# Patient Record
Sex: FEMALE | Race: ASIAN | NOT HISPANIC OR LATINO | Employment: FULL TIME | URBAN - METROPOLITAN AREA
[De-identification: names, ages, dates, MRNs, and addresses within clinical notes are randomized per-mention and may not be internally consistent; named-entity substitution may affect disease eponyms.]

---

## 2017-11-11 ENCOUNTER — OFFICE VISIT (OUTPATIENT)
Dept: URGENT CARE | Facility: PHYSICIAN GROUP | Age: 32
End: 2017-11-11
Payer: COMMERCIAL

## 2017-11-11 VITALS
HEIGHT: 64 IN | DIASTOLIC BLOOD PRESSURE: 90 MMHG | OXYGEN SATURATION: 100 % | TEMPERATURE: 98.6 F | SYSTOLIC BLOOD PRESSURE: 140 MMHG | WEIGHT: 130 LBS | BODY MASS INDEX: 22.2 KG/M2 | RESPIRATION RATE: 14 BRPM | HEART RATE: 99 BPM

## 2017-11-11 DIAGNOSIS — M54.41 ACUTE RIGHT-SIDED LOW BACK PAIN WITH RIGHT-SIDED SCIATICA: ICD-10-CM

## 2017-11-11 PROCEDURE — 99204 OFFICE O/P NEW MOD 45 MIN: CPT | Performed by: NURSE PRACTITIONER

## 2017-11-11 RX ORDER — CYCLOBENZAPRINE HCL 5 MG
5-10 TABLET ORAL 2 TIMES DAILY PRN
Qty: 20 TAB | Refills: 0 | Status: SHIPPED | OUTPATIENT
Start: 2017-11-11 | End: 2017-11-16

## 2017-11-11 RX ORDER — HYDROCODONE BITARTRATE AND ACETAMINOPHEN 5; 325 MG/1; MG/1
1-2 TABLET ORAL EVERY 8 HOURS PRN
Qty: 12 TAB | Refills: 0 | Status: SHIPPED | OUTPATIENT
Start: 2017-11-11

## 2017-11-11 RX ORDER — METHYLPREDNISOLONE 4 MG/1
4 TABLET ORAL DAILY
Qty: 1 KIT | Refills: 0 | Status: SHIPPED | OUTPATIENT
Start: 2017-11-11

## 2017-11-11 NOTE — PROGRESS NOTES
Chief Complaint   Patient presents with   • Back Pain       HISTORY OF PRESENT ILLNESS: Patient is a 32 y.o. female who presents today due to three days of low back pain. She initially felt back pain after lifting a wheelchair into her car. Since then she has had right-sided low back pain which radiates down her right leg. She reports intermittent tingling sensation to this leg as well. She admits to a history of low back pain with intermittent exacerbations, last experience personally 3 years ago. Denies fever, chills, malaise, hematuria, saddle anesthesia, numbness or tingling, unilateral weakness, or loss of bowel or bladder. She has not taken anything for symptom relief.      There are no active problems to display for this patient.      Allergies:Sertraline    Current Outpatient Prescriptions Ordered in [facility]   Medication Sig Dispense Refill   • BuPROPion HCl (WELLBUTRIN PO) Take  by mouth.     • MethylPREDNISolone (MEDROL DOSEPAK) 4 MG Tablet Therapy Pack Take 1 Tab by mouth every day. Take with food. 1 Kit 0   • cyclobenzaprine (FLEXERIL) 5 MG tablet Take 1-2 Tabs by mouth 2 times a day as needed for Moderate Pain or Severe Pain. 20 Tab 0   • hydrocodone-acetaminophen (NORCO) 5-325 MG Tab per tablet Take 1-2 Tabs by mouth every 8 hours as needed. Do not drive, work, drink alcohol or engaged in potentially hazardous activity while taking this medication. 12 Tab 0     No current Epic-ordered facility-administered medications on file.        History reviewed. No pertinent past medical history.    Social History   Substance Use Topics   • Smoking status: Never Smoker   • Smokeless tobacco: Never Used   • Alcohol use Yes      Comment: occ       No family status information on file.   History reviewed. No pertinent family history.    ROS:  Review of Systems   Constitutional: Negative for fever, chills, weight loss, malaise, and fatigue.   HENT: Negative for ear pain, nosebleeds, congestion, sore throat and neck  "pain.    Eyes: Negative for vision changes.   Neuro: Negative for headache, sensory changes, weakness, seizure, LOC.   Cardiovascular: Negative for chest pain, palpitations, orthopnea and leg swelling.   Respiratory: Negative for cough, sputum production, shortness of breath and wheezing.   Gastrointestinal: Negative for abdominal pain, nausea, vomiting or diarrhea.   Genitourinary: Negative for dysuria, urgency and frequency.  Musculoskeletal: Positive for low back pain. Negative for falls, neck pain, joint pain, myalgias.   Skin: Negative for rash, diaphoresis.     Exam:  Blood pressure 140/90, pulse 99, temperature 37 °C (98.6 °F), resp. rate 14, height 1.626 m (5' 4\"), weight 59 kg (130 lb), SpO2 100 %.  General: well-nourished, well-developed female in NAD  Head: normocephalic, atraumatic  Eyes: PERRLA, no conjunctival injection, acuity grossly intact, lids normal.  Ears: normal shape and symmetry, no tenderness, no discharge. External canals are without any significant edema or erythema. Tympanic membranes are without any inflammation, no effusion. Gross auditory acuity is intact.  Nose: symmetrical without tenderness, no discharge.  Mouth/Throat: reasonable hygiene, no erythema, exudates or tonsillar enlargement.  Neck: no masses, range of motion within normal limits, no tracheal deviation. No obvious thyroid enlargement.   Lymph: no cervical adenopathy. No supraclavicular adenopathy.   Neuro: alert and oriented. Cranial nerves 1-12 grossly intact. No sensory deficit.   Cardiovascular: regular rate and rhythm. No edema.  Pulmonary: no distress. Chest is symmetrical with respiration, no wheezes, crackles, or rhonchi.   Musculoskeletal: Lumbar back: pt exhibits tenderness to the lumbar spine. Pt exhibits no swelling, spasm, no edema or deformity. Patient has normal reflexes, patellar reflexes are 2+ on the right side and 2+ on the left side. Patient exhibits normal muscle tone. Negative straight leg raises. " Gait even and steady. No clubbing.   Skin: warm, dry, intact, no clubbing, no cyanosis, no rashes.   Psych: appropriate mood, affect, judgement.         Assessment/Plan:  1. Acute right-sided low back pain with right-sided sciatica  MethylPREDNISolone (MEDROL DOSEPAK) 4 MG Tablet Therapy Pack    cyclobenzaprine (FLEXERIL) 5 MG tablet    hydrocodone-acetaminophen (NORCO) 5-325 MG Tab per tablet         Ice and heat therapy. Gentle stretches. Medrol as directed, take with food. Flexeril or Norco for symptom relief, sedative effects medication discussed.  Supportive care, differential diagnoses, and indications for immediate follow-up discussed with patient.   Pathogenesis of diagnosis discussed including typical length and natural progression.   Instructed to return to clinic or nearest emergency department for any change in condition, further concerns, or worsening of symptoms.  Patient states understanding of the plan of care and discharge instructions.  Instructed to make an appointment, for follow up, with their primary care provider.        Please note that this dictation was created using voice recognition software. I have made every reasonable attempt to correct obvious errors, but I expect that there are errors of grammar and possibly content that I did not discover before finalizing the note.      JHOANA Gregorio.

## 2017-11-16 ENCOUNTER — OFFICE VISIT (OUTPATIENT)
Dept: URGENT CARE | Facility: PHYSICIAN GROUP | Age: 32
End: 2017-11-16
Payer: COMMERCIAL

## 2017-11-16 VITALS
WEIGHT: 130 LBS | OXYGEN SATURATION: 98 % | RESPIRATION RATE: 14 BRPM | BODY MASS INDEX: 22.2 KG/M2 | DIASTOLIC BLOOD PRESSURE: 88 MMHG | TEMPERATURE: 98.3 F | HEART RATE: 100 BPM | HEIGHT: 64 IN | SYSTOLIC BLOOD PRESSURE: 142 MMHG

## 2017-11-16 DIAGNOSIS — M54.41 ACUTE RIGHT-SIDED LOW BACK PAIN WITH RIGHT-SIDED SCIATICA: ICD-10-CM

## 2017-11-16 PROCEDURE — 99214 OFFICE O/P EST MOD 30 MIN: CPT | Performed by: FAMILY MEDICINE

## 2017-11-16 RX ORDER — MELOXICAM 15 MG/1
15 TABLET ORAL DAILY
Qty: 30 TAB | Refills: 0 | Status: SHIPPED | OUTPATIENT
Start: 2017-11-16

## 2017-11-16 RX ORDER — FAMOTIDINE 20 MG/1
20 TABLET, FILM COATED ORAL 2 TIMES DAILY
Qty: 60 TAB | Refills: 0 | Status: SHIPPED | OUTPATIENT
Start: 2017-11-16

## 2017-11-16 RX ORDER — OMEPRAZOLE 20 MG/1
20 CAPSULE, DELAYED RELEASE ORAL DAILY
Qty: 30 CAP | Refills: 0 | Status: SHIPPED | OUTPATIENT
Start: 2017-11-16

## 2017-11-16 RX ORDER — TIZANIDINE 4 MG/1
4 TABLET ORAL EVERY 6 HOURS PRN
Qty: 30 TAB | Refills: 0 | Status: SHIPPED | OUTPATIENT
Start: 2017-11-16

## 2017-11-16 ASSESSMENT — ENCOUNTER SYMPTOMS
WEAKNESS: 0
NUMBNESS: 0
PARESIS: 0
PERIANAL NUMBNESS: 0
PARESTHESIAS: 0
BACK PAIN: 1
TINGLING: 0
BOWEL INCONTINENCE: 0

## 2017-11-16 NOTE — PATIENT INSTRUCTIONS
Back Pain, Adult  Back pain is very common in adults. The cause of back pain is rarely dangerous and the pain often gets better over time. The cause of your back pain may not be known. Some common causes of back pain include:  · Strain of the muscles or ligaments supporting the spine.  · Wear and tear (degeneration) of the spinal disks.  · Arthritis.  · Direct injury to the back.  For many people, back pain may return. Since back pain is rarely dangerous, most people can learn to manage this condition on their own.  HOME CARE INSTRUCTIONS  Watch your back pain for any changes. The following actions may help to lessen any discomfort you are feeling:  · Remain active. It is stressful on your back to sit or  one place for long periods of time. Do not sit, drive, or  one place for more than 30 minutes at a time. Take short walks on even surfaces as soon as you are able. Try to increase the length of time you walk each day.  · Exercise regularly as directed by your health care provider. Exercise helps your back heal faster. It also helps avoid future injury by keeping your muscles strong and flexible.  · Do not stay in bed. Resting more than 1-2 days can delay your recovery.  · Pay attention to your body when you bend and lift. The most comfortable positions are those that put less stress on your recovering back. Always use proper lifting techniques, including:  ¨ Bending your knees.  ¨ Keeping the load close to your body.  ¨ Avoiding twisting.  · Find a comfortable position to sleep. Use a firm mattress and lie on your side with your knees slightly bent. If you lie on your back, put a pillow under your knees.  · Avoid feeling anxious or stressed. Stress increases muscle tension and can worsen back pain. It is important to recognize when you are anxious or stressed and learn ways to manage it, such as with exercise.  · Take medicines only as directed by your health care provider. Over-the-counter  medicines to reduce pain and inflammation are often the most helpful. Your health care provider may prescribe muscle relaxant drugs. These medicines help dull your pain so you can more quickly return to your normal activities and healthy exercise.  · Apply ice to the injured area:  ¨ Put ice in a plastic bag.  ¨ Place a towel between your skin and the bag.  ¨ Leave the ice on for 20 minutes, 2-3 times a day for the first 2-3 days. After that, ice and heat may be alternated to reduce pain and spasms.  · Maintain a healthy weight. Excess weight puts extra stress on your back and makes it difficult to maintain good posture.  SEEK MEDICAL CARE IF:  · You have pain that is not relieved with rest or medicine.  · You have increasing pain going down into the legs or buttocks.  · You have pain that does not improve in one week.  · You have night pain.  · You lose weight.  · You have a fever or chills.  SEEK IMMEDIATE MEDICAL CARE IF:   · You develop new bowel or bladder control problems.  · You have unusual weakness or numbness in your arms or legs.  · You develop nausea or vomiting.  · You develop abdominal pain.  · You feel faint.     This information is not intended to replace advice given to you by your health care provider. Make sure you discuss any questions you have with your health care provider.     Document Released: 12/18/2006 Document Revised: 01/08/2016 Document Reviewed: 04/21/2015  [employer] Interactive Patient Education ©2016 [employer]

## 2017-11-16 NOTE — PROGRESS NOTES
"Subjective:   Peggy Quick a 32 y.o. female who presents for Back Pain (back pain pt was in here saturday not feeling any better )        Back Pain   This is a new problem. The current episode started in the past 7 days. The problem occurs constantly. The problem has been gradually worsening since onset. The pain is present in the gluteal and sacro-iliac. The quality of the pain is described as aching. The pain radiates to the right thigh. Pertinent negatives include no bladder incontinence, bowel incontinence, numbness, paresis, paresthesias, perianal numbness, tingling or weakness.     Review of Systems   Gastrointestinal: Negative for bowel incontinence.   Genitourinary: Negative for bladder incontinence.   Musculoskeletal: Positive for back pain.   Neurological: Negative for tingling, weakness, numbness and paresthesias.     Allergies   Allergen Reactions   • Sertraline Anxiety      Objective:   /88   Pulse 100   Temp 36.8 °C (98.3 °F)   Resp 14   Ht 1.626 m (5' 4\")   Wt 59 kg (130 lb)   SpO2 98%   BMI 22.31 kg/m²   Physical Exam   Constitutional: She is oriented to person, place, and time. She appears well-developed and well-nourished. No distress.   HENT:   Head: Normocephalic and atraumatic.   Eyes: Conjunctivae and EOM are normal. Pupils are equal, round, and reactive to light.   Cardiovascular: Normal rate, regular rhythm, normal heart sounds and intact distal pulses.    No murmur heard.  Pulmonary/Chest: Effort normal and breath sounds normal. No respiratory distress.   Abdominal: Soft. Bowel sounds are normal. She exhibits no distension. There is no tenderness.   Musculoskeletal:        Lumbar back: She exhibits decreased range of motion, tenderness and spasm. She exhibits no bony tenderness and no deformity.   Neurological: She is alert and oriented to person, place, and time. She has normal reflexes. No sensory deficit.   Skin: Skin is warm and dry.   Psychiatric: She has a normal mood " and affect. Her behavior is normal.         Assessment/Plan:   Assessment    1. Acute right-sided low back pain with right-sided sciatica  Differential diagnosis, natural history, supportive care, and indications for immediate follow-up discussed.   - meloxicam (MOBIC) 15 MG tablet; Take 1 Tab by mouth every day.  Dispense: 30 Tab; Refill: 0  - tizanidine (ZANAFLEX) 4 MG Tab; Take 1 Tab by mouth every 6 hours as needed.  Dispense: 30 Tab; Refill: 0  - famotidine (PEPCID) 20 MG Tab; Take 1 Tab by mouth 2 times a day.  Dispense: 60 Tab; Refill: 0  - omeprazole (PRILOSEC) 20 MG delayed-release capsule; Take 1 Cap by mouth every day.  Dispense: 30 Cap; Refill: 0

## 2018-06-11 ENCOUNTER — OFFICE VISIT (OUTPATIENT)
Dept: URGENT CARE | Facility: PHYSICIAN GROUP | Age: 33
End: 2018-06-11
Payer: COMMERCIAL

## 2018-06-11 ENCOUNTER — HOSPITAL ENCOUNTER (OUTPATIENT)
Dept: RADIOLOGY | Facility: MEDICAL CENTER | Age: 33
End: 2018-06-11
Attending: EMERGENCY MEDICINE
Payer: COMMERCIAL

## 2018-06-11 VITALS
RESPIRATION RATE: 16 BRPM | TEMPERATURE: 98.4 F | WEIGHT: 129 LBS | HEART RATE: 89 BPM | BODY MASS INDEX: 22.02 KG/M2 | DIASTOLIC BLOOD PRESSURE: 68 MMHG | OXYGEN SATURATION: 100 % | HEIGHT: 64 IN | SYSTOLIC BLOOD PRESSURE: 124 MMHG
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
## 2018-06-11 DIAGNOSIS — S39.012A STRAIN, SACRAL, INITIAL ENCOUNTER: ICD-10-CM

## 2018-06-11 DIAGNOSIS — S39.012A ACUTE MYOFASCIAL STRAIN OF LUMBAR REGION, INITIAL ENCOUNTER: ICD-10-CM

## 2018-06-11 DIAGNOSIS — S16.1XXA ACUTE STRAIN OF NECK MUSCLE, INITIAL ENCOUNTER: ICD-10-CM

## 2018-06-11 PROCEDURE — 72220 X-RAY EXAM SACRUM TAILBONE: CPT

## 2018-06-11 PROCEDURE — 72100 X-RAY EXAM L-S SPINE 2/3 VWS: CPT

## 2018-06-11 PROCEDURE — 99203 OFFICE O/P NEW LOW 30 MIN: CPT | Performed by: EMERGENCY MEDICINE

## 2018-06-11 PROCEDURE — 72040 X-RAY EXAM NECK SPINE 2-3 VW: CPT

## 2018-06-11 RX ORDER — BUPROPION HYDROCHLORIDE 200 MG/1
TABLET, EXTENDED RELEASE ORAL
COMMUNITY
Start: 2018-06-07

## 2018-06-11 RX ORDER — CYCLOBENZAPRINE HCL 5 MG
TABLET ORAL
COMMUNITY
Start: 2018-05-31

## 2018-06-11 RX ORDER — METHOCARBAMOL 750 MG/1
1500 TABLET, FILM COATED ORAL 3 TIMES DAILY
Qty: 60 TAB | Refills: 1 | Status: SHIPPED | OUTPATIENT
Start: 2018-06-11

## 2018-06-11 RX ORDER — METHOCARBAMOL 750 MG/1
1500 TABLET, FILM COATED ORAL 3 TIMES DAILY
Qty: 60 TAB | Refills: 0 | Status: SHIPPED | OUTPATIENT
Start: 2018-06-11 | End: 2018-06-11

## 2018-06-11 RX ORDER — IBUPROFEN 200 MG
200 TABLET ORAL EVERY 6 HOURS PRN
COMMUNITY

## 2018-06-12 ASSESSMENT — ENCOUNTER SYMPTOMS
VOMITING: 0
FEVER: 1
LOSS OF CONSCIOUSNESS: 0
NERVOUS/ANXIOUS: 0
BACK PAIN: 1
ABDOMINAL PAIN: 0
MYALGIAS: 1
NAUSEA: 0
CONSTIPATION: 0
NECK PAIN: 1
PALPITATIONS: 0
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
## 2021-06-16 ENCOUNTER — HOSPITAL ENCOUNTER (OUTPATIENT)
Facility: MEDICAL CENTER | Age: 36
End: 2021-06-16
Attending: PREVENTIVE MEDICINE
Payer: COMMERCIAL

## 2021-06-16 ENCOUNTER — EH NON-PROVIDER (OUTPATIENT)
Dept: OCCUPATIONAL MEDICINE | Facility: CLINIC | Age: 36
End: 2021-06-16

## 2021-06-16 DIAGNOSIS — Z02.89 ENCOUNTER FOR OCCUPATIONAL HEALTH ASSESSMENT: ICD-10-CM

## 2021-06-16 DIAGNOSIS — Z02.1 PRE-EMPLOYMENT DRUG SCREENING: ICD-10-CM

## 2021-06-16 LAB
ALBUMIN SERPL BCP-MCNC: 4.5 G/DL (ref 3.2–4.9)
ALBUMIN/GLOB SERPL: 1.6 G/DL
ALP SERPL-CCNC: 67 U/L (ref 30–99)
ALT SERPL-CCNC: 62 U/L (ref 2–50)
AMP AMPHETAMINE: NORMAL
ANION GAP SERPL CALC-SCNC: 11 MMOL/L (ref 7–16)
AST SERPL-CCNC: 41 U/L (ref 12–45)
BAR BARBITURATES: NORMAL
BASOPHILS # BLD AUTO: 1.3 % (ref 0–1.8)
BASOPHILS # BLD: 0.06 K/UL (ref 0–0.12)
BILIRUB SERPL-MCNC: 0.5 MG/DL (ref 0.1–1.5)
BUN SERPL-MCNC: 7 MG/DL (ref 8–22)
BZO BENZODIAZEPINES: NORMAL
CALCIUM SERPL-MCNC: 9 MG/DL (ref 8.5–10.5)
CHLORIDE SERPL-SCNC: 106 MMOL/L (ref 96–112)
CO2 SERPL-SCNC: 21 MMOL/L (ref 20–33)
COC COCAINE: NORMAL
CREAT SERPL-MCNC: 0.8 MG/DL (ref 0.5–1.4)
EOSINOPHIL # BLD AUTO: 0.11 K/UL (ref 0–0.51)
EOSINOPHIL NFR BLD: 2.4 % (ref 0–6.9)
ERYTHROCYTE [DISTWIDTH] IN BLOOD BY AUTOMATED COUNT: 42.5 FL (ref 35.9–50)
GLOBULIN SER CALC-MCNC: 2.8 G/DL (ref 1.9–3.5)
GLUCOSE SERPL-MCNC: 90 MG/DL (ref 65–99)
HCT VFR BLD AUTO: 41.6 % (ref 37–47)
HGB BLD-MCNC: 13.6 G/DL (ref 12–16)
IMM GRANULOCYTES # BLD AUTO: 0.01 K/UL (ref 0–0.11)
IMM GRANULOCYTES NFR BLD AUTO: 0.2 % (ref 0–0.9)
INT CON NEG: NORMAL
INT CON POS: NORMAL
LYMPHOCYTES # BLD AUTO: 1.9 K/UL (ref 1–4.8)
LYMPHOCYTES NFR BLD: 40.6 % (ref 22–41)
MCH RBC QN AUTO: 30.7 PG (ref 27–33)
MCHC RBC AUTO-ENTMCNC: 32.7 G/DL (ref 33.6–35)
MCV RBC AUTO: 93.9 FL (ref 81.4–97.8)
MDMA ECSTASY: NORMAL
MET METHAMPHETAMINES: NORMAL
MONOCYTES # BLD AUTO: 0.43 K/UL (ref 0–0.85)
MONOCYTES NFR BLD AUTO: 9.2 % (ref 0–13.4)
MTD METHADONE: NORMAL
NEUTROPHILS # BLD AUTO: 2.17 K/UL (ref 2–7.15)
NEUTROPHILS NFR BLD: 46.3 % (ref 44–72)
NRBC # BLD AUTO: 0 K/UL
NRBC BLD-RTO: 0 /100 WBC
OPI OPIATES: NORMAL
OXY OXYCODONE: NORMAL
PCP PHENCYCLIDINE: NORMAL
PLATELET # BLD AUTO: 248 K/UL (ref 164–446)
PMV BLD AUTO: 11.3 FL (ref 9–12.9)
POC URINE DRUG SCREEN OCDRS: NEGATIVE
POTASSIUM SERPL-SCNC: 4.4 MMOL/L (ref 3.6–5.5)
PROT SERPL-MCNC: 7.3 G/DL (ref 6–8.2)
RBC # BLD AUTO: 4.43 M/UL (ref 4.2–5.4)
SODIUM SERPL-SCNC: 138 MMOL/L (ref 135–145)
THC: NORMAL
WBC # BLD AUTO: 4.7 K/UL (ref 4.8–10.8)

## 2021-06-16 PROCEDURE — 86480 TB TEST CELL IMMUN MEASURE: CPT | Performed by: PREVENTIVE MEDICINE

## 2021-06-16 PROCEDURE — 80053 COMPREHEN METABOLIC PANEL: CPT | Performed by: PREVENTIVE MEDICINE

## 2021-06-16 PROCEDURE — 80305 DRUG TEST PRSMV DIR OPT OBS: CPT | Performed by: PREVENTIVE MEDICINE

## 2021-06-16 PROCEDURE — 85025 COMPLETE CBC W/AUTO DIFF WBC: CPT | Performed by: PREVENTIVE MEDICINE

## 2021-06-16 PROCEDURE — 94375 RESPIRATORY FLOW VOLUME LOOP: CPT | Performed by: PREVENTIVE MEDICINE

## 2021-06-18 LAB
GAMMA INTERFERON BACKGROUND BLD IA-ACNC: 0.03 IU/ML
M TB IFN-G BLD-IMP: NEGATIVE
M TB IFN-G CD4+ BCKGRND COR BLD-ACNC: 0 IU/ML
MITOGEN IGNF BCKGRD COR BLD-ACNC: >10 IU/ML
QFT TB2 - NIL TBQ2: 0 IU/ML

## 2021-07-12 ENCOUNTER — EMPLOYEE HEALTH (OUTPATIENT)
Dept: OCCUPATIONAL MEDICINE | Facility: CLINIC | Age: 36
End: 2021-07-12

## 2021-07-12 DIAGNOSIS — Z02.1 PRE-EMPLOYMENT HEALTH SCREENING EXAMINATION: ICD-10-CM

## 2021-07-12 PROCEDURE — 8915 PR COMPREHENSIVE PHYSICAL: Performed by: NURSE PRACTITIONER

## 2024-06-24 ENCOUNTER — HOSPITAL ENCOUNTER (OUTPATIENT)
Facility: MEDICAL CENTER | Age: 39
End: 2024-06-24
Attending: FAMILY MEDICINE
Payer: COMMERCIAL

## 2024-06-24 LAB
ALBUMIN SERPL BCP-MCNC: 4.4 G/DL (ref 3.2–4.9)
ALBUMIN/GLOB SERPL: 1.8 G/DL
ALP SERPL-CCNC: 56 U/L (ref 30–99)
ALT SERPL-CCNC: 17 U/L (ref 2–50)
ANION GAP SERPL CALC-SCNC: 11 MMOL/L (ref 7–16)
AST SERPL-CCNC: 22 U/L (ref 12–45)
BASOPHILS # BLD AUTO: 1.5 % (ref 0–1.8)
BASOPHILS # BLD: 0.07 K/UL (ref 0–0.12)
BILIRUB SERPL-MCNC: 0.2 MG/DL (ref 0.1–1.5)
BUN SERPL-MCNC: 21 MG/DL (ref 8–22)
CALCIUM ALBUM COR SERPL-MCNC: 8.5 MG/DL (ref 8.5–10.5)
CALCIUM SERPL-MCNC: 8.8 MG/DL (ref 8.5–10.5)
CHLORIDE SERPL-SCNC: 105 MMOL/L (ref 96–112)
CO2 SERPL-SCNC: 22 MMOL/L (ref 20–33)
CREAT SERPL-MCNC: 0.85 MG/DL (ref 0.5–1.4)
EOSINOPHIL # BLD AUTO: 0.09 K/UL (ref 0–0.51)
EOSINOPHIL NFR BLD: 2 % (ref 0–6.9)
ERYTHROCYTE [DISTWIDTH] IN BLOOD BY AUTOMATED COUNT: 42.5 FL (ref 35.9–50)
GFR SERPLBLD CREATININE-BSD FMLA CKD-EPI: 90 ML/MIN/1.73 M 2
GLOBULIN SER CALC-MCNC: 2.5 G/DL (ref 1.9–3.5)
GLUCOSE SERPL-MCNC: 128 MG/DL (ref 65–99)
HCT VFR BLD AUTO: 38.1 % (ref 37–47)
HGB BLD-MCNC: 12.2 G/DL (ref 12–16)
IMM GRANULOCYTES # BLD AUTO: 0.01 K/UL (ref 0–0.11)
IMM GRANULOCYTES NFR BLD AUTO: 0.2 % (ref 0–0.9)
LYMPHOCYTES # BLD AUTO: 1.99 K/UL (ref 1–4.8)
LYMPHOCYTES NFR BLD: 43.4 % (ref 22–41)
MCH RBC QN AUTO: 29.3 PG (ref 27–33)
MCHC RBC AUTO-ENTMCNC: 32 G/DL (ref 32.2–35.5)
MCV RBC AUTO: 91.6 FL (ref 81.4–97.8)
MONOCYTES # BLD AUTO: 0.5 K/UL (ref 0–0.85)
MONOCYTES NFR BLD AUTO: 10.9 % (ref 0–13.4)
NEUTROPHILS # BLD AUTO: 1.93 K/UL (ref 1.82–7.42)
NEUTROPHILS NFR BLD: 42 % (ref 44–72)
NRBC # BLD AUTO: 0 K/UL
NRBC BLD-RTO: 0 /100 WBC (ref 0–0.2)
PLATELET # BLD AUTO: 236 K/UL (ref 164–446)
PMV BLD AUTO: 11.6 FL (ref 9–12.9)
POTASSIUM SERPL-SCNC: 4.3 MMOL/L (ref 3.6–5.5)
PROT SERPL-MCNC: 6.9 G/DL (ref 6–8.2)
RBC # BLD AUTO: 4.16 M/UL (ref 4.2–5.4)
SODIUM SERPL-SCNC: 138 MMOL/L (ref 135–145)
TSH SERPL DL<=0.005 MIU/L-ACNC: 2.13 UIU/ML (ref 0.38–5.33)
WBC # BLD AUTO: 4.6 K/UL (ref 4.8–10.8)

## 2024-06-24 PROCEDURE — 84443 ASSAY THYROID STIM HORMONE: CPT

## 2024-06-24 PROCEDURE — 85025 COMPLETE CBC W/AUTO DIFF WBC: CPT

## 2024-06-24 PROCEDURE — 80053 COMPREHEN METABOLIC PANEL: CPT

## 2024-07-06 ENCOUNTER — HOSPITAL ENCOUNTER (OUTPATIENT)
Dept: RADIOLOGY | Facility: MEDICAL CENTER | Age: 39
End: 2024-07-06
Attending: FAMILY MEDICINE
Payer: COMMERCIAL

## 2024-07-06 DIAGNOSIS — M54.50 LUMBAR PAIN: ICD-10-CM

## 2024-07-06 DIAGNOSIS — G62.9 NEUROPATHY: ICD-10-CM

## 2024-07-06 PROCEDURE — 72148 MRI LUMBAR SPINE W/O DYE: CPT

## 2024-10-18 ENCOUNTER — OFFICE VISIT (OUTPATIENT)
Dept: CARDIOLOGY | Facility: MEDICAL CENTER | Age: 39
End: 2024-10-18
Attending: INTERNAL MEDICINE
Payer: COMMERCIAL

## 2024-10-18 VITALS
HEIGHT: 64 IN | DIASTOLIC BLOOD PRESSURE: 84 MMHG | SYSTOLIC BLOOD PRESSURE: 118 MMHG | WEIGHT: 151 LBS | BODY MASS INDEX: 25.78 KG/M2 | HEART RATE: 74 BPM | OXYGEN SATURATION: 99 % | RESPIRATION RATE: 12 BRPM

## 2024-10-18 DIAGNOSIS — R00.2 PALPITATIONS: ICD-10-CM

## 2024-10-18 DIAGNOSIS — I49.3 PVC'S (PREMATURE VENTRICULAR CONTRACTIONS): ICD-10-CM

## 2024-10-18 LAB — EKG IMPRESSION: NORMAL

## 2024-10-18 PROCEDURE — 93010 ELECTROCARDIOGRAM REPORT: CPT | Performed by: INTERNAL MEDICINE

## 2024-10-18 PROCEDURE — 93005 ELECTROCARDIOGRAM TRACING: CPT | Performed by: INTERNAL MEDICINE

## 2024-10-18 PROCEDURE — 3079F DIAST BP 80-89 MM HG: CPT | Performed by: INTERNAL MEDICINE

## 2024-10-18 PROCEDURE — 99204 OFFICE O/P NEW MOD 45 MIN: CPT | Performed by: INTERNAL MEDICINE

## 2024-10-18 PROCEDURE — 3074F SYST BP LT 130 MM HG: CPT | Performed by: INTERNAL MEDICINE

## 2024-10-18 PROCEDURE — 99212 OFFICE O/P EST SF 10 MIN: CPT | Performed by: INTERNAL MEDICINE

## 2024-10-18 RX ORDER — ALPRAZOLAM 0.5 MG
1 TABLET ORAL
COMMUNITY

## 2024-10-18 RX ORDER — ATENOLOL 25 MG/1
25 TABLET ORAL DAILY
Qty: 30 TABLET | Refills: 0 | Status: SHIPPED | OUTPATIENT
Start: 2024-10-18

## 2024-10-18 RX ORDER — PREGABALIN 100 MG/1
CAPSULE ORAL
COMMUNITY
Start: 2024-09-16

## 2024-10-18 RX ORDER — CELECOXIB 200 MG/1
CAPSULE ORAL
COMMUNITY

## 2024-10-18 ASSESSMENT — FIBROSIS 4 INDEX: FIB4 SCORE: 0.84

## 2024-11-04 NOTE — PROGRESS NOTES
Home enrollment completed in the 14 day o Holter monitor per Xiomara Christopher MD. Monitor will be shipped to patient via [employer], pending EOS.

## 2024-11-08 ENCOUNTER — NON-PROVIDER VISIT (OUTPATIENT)
Dept: CARDIOLOGY | Facility: MEDICAL CENTER | Age: 39
End: 2024-11-08
Attending: INTERNAL MEDICINE
Payer: COMMERCIAL

## 2024-11-08 DIAGNOSIS — I49.3 PVC'S (PREMATURE VENTRICULAR CONTRACTIONS): ICD-10-CM

## 2024-11-08 DIAGNOSIS — R00.2 PALPITATIONS: ICD-10-CM

## 2024-11-15 ENCOUNTER — APPOINTMENT (OUTPATIENT)
Dept: CARDIOLOGY | Facility: MEDICAL CENTER | Age: 39
End: 2024-11-15
Attending: INTERNAL MEDICINE
Payer: COMMERCIAL

## 2024-12-03 NOTE — PROGRESS NOTES
Medical decision making:  -Having palpitations and ECG with PVCs.  -Also notes at times that the heart rate seems unusually fast for rest  -Long-acting propranolol was too powerful  -Symptoms seem to be worse at night so she will try propranolol in the evening or at bedtime  -Backup medication will be atenolol, see below  -We also have multiple other options to try including metoprolol and calcium channel blockers  -Zio patch heart monitor  -Echocardiogram  -Already exercising  -Will consider if she can take higher doses of magnesium as long as it does not cause GI distress  -We will see her back after testing      Problem list:  1. Palpitations    2. PVC's (premature ventricular contractions)     Chief Complaint:   No chief complaint on file.    History of Present Illness:  Peggy Vicente is a 39 y.o. female who is referred by Dr. Jorge Kolb for palpitations, PVCs.    Has had palpitations, racing heart, skipping beats since her early 20s. Thought it was related to stress related to motherhood, would notice worse at rest.     PCP found PVCs and tried long acting propranolol, but cause lightheadedness, fatigue.  Has short acting propranolol, does not help her feel better.    Taking Mg OTC, not sure of the dose.     Covid July 2023, not sure if PVCs got worse.   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    Former smoker.    No prior hypertension.  No prior hyperlipidemia.  No family history of premature coronary artery disease.  No history of diabetes.  No history of autoimmune disease such as lupus or rheumatoid arthritis.  No history of chest radiation or cardiotoxic chemotherapy.  No chronic kidney disease.  No ETOH overuse.  No caffeine overuse.  No recreation substance use.  No hx asthma.    Not limited by chest pain, pressure or tightness with activity.  No significant dyspnea on exertion, orthopnea or lower extremity swelling.  No significant lightheadedness, or  "presyncope/syncope.  No symptoms of leg claudication.  No stroke/TIA like symptoms.    Lives in [place].   to Jose Armando ICU RN at [facility].  2 kids.   Pharmacist.     Wt Readings from Last 5 Encounters:   10/18/24 68.5 kg (151 lb)   06/11/18 58.5 kg (129 lb)   11/16/17 59 kg (130 lb)   11/11/17 59 kg (130 lb)   10/02/14 70.3 kg (155 lb)       DATA REVIEWED by me:  ECG (my personal interpretation) 10/18/2024 sinus, 75, rightward axis, PVC    Echo pending    Most recent labs:       Lab Results   Component Value Date/Time    WBC 4.6 (L) 06/24/2024 08:19 AM    HEMOGLOBIN 12.2 06/24/2024 08:19 AM    HEMATOCRIT 38.1 06/24/2024 08:19 AM    MCV 91.6 06/24/2024 08:19 AM      Lab Results   Component Value Date/Time    SODIUM 138 06/24/2024 08:19 AM    POTASSIUM 4.3 06/24/2024 08:19 AM    CHLORIDE 105 06/24/2024 08:19 AM    CO2 22 06/24/2024 08:19 AM    GLUCOSE 128 (H) 06/24/2024 08:19 AM    BUN 21 06/24/2024 08:19 AM    CREATININE 0.85 06/24/2024 08:19 AM      Lab Results   Component Value Date/Time    ASTSGOT 22 06/24/2024 08:19 AM    ALTSGPT 17 06/24/2024 08:19 AM    ALBUMIN 4.4 06/24/2024 08:19 AM      No results found for: \"CHOLSTRLTOT\", \"LDL\", \"HDL\", \"TRIGLYCERIDE\"  No results for input(s): \"NTPROBNP\", \"TROPONINT\" in the last 72 hours.      No past medical history on file.  No past surgical history on file.  No family history on file.  Social History     Socioeconomic History    Marital status:      Spouse name: Not on file    Number of children: Not on file    Years of education: Not on file    Highest education level: Not on file   Occupational History    Not on file   Tobacco Use    Smoking status: Former     Types: Cigarettes    Smokeless tobacco: Never   Vaping Use    Vaping status: Every Day   Substance and Sexual Activity    Alcohol use: Yes     Comment: occ    Drug use: No    Sexual activity: Not on file   Other Topics Concern    Not on file   Social History Narrative    Not on file     Social Drivers " of Health     Financial Resource Strain: Not on file   Food Insecurity: Not on file   Transportation Needs: Not on file   Physical Activity: Not on file   Stress: Not on file   Social Connections: Not on file   Intimate Partner Violence: Not on file   Housing Stability: Not on file     Allergies   Allergen Reactions    Sertraline Anxiety       Current Outpatient Medications   Medication Sig Dispense Refill    pregabalin (LYRICA) 100 MG Cap TAKE ONE CAPSULE BY MOUTH DAILY FOR 30 DAYS      ALPRAZolam (XANAX) 0.5 MG Tab Take 1 Tablet by mouth 1 time a day as needed.      celecoxib (CELEBREX) 200 MG Cap TAKE ONE CAPSULE BY MOUTH DAILY FOR 30 DAYS      atenolol (TENORMIN) 25 MG Tab Take 1 Tablet by mouth every day. 30 Tablet 0    tizanidine (ZANAFLEX) 4 MG Tab Take 1 Tab by mouth every 6 hours as needed. 30 Tab 0    BuPROPion HCl (WELLBUTRIN PO) Take  by mouth.       No current facility-administered medications for this visit.       ROS  All others systems reviewed and negative.    There were no vitals taken for this visit. There is no height or weight on file to calculate BMI.    General: No acute distress. Well nourished.  HEENT: EOM grossly intact, no scleral icterus, no pharyngeal erythema.   Neck:  No JVD, no bruits, trachea midline  CVS: RRR. Normal S1, S2. No M/R/G. No LE edema.  2+ radial pulses, 2+ PT pulses  Resp: CTAB. No wheezing or crackles/rhonchi. Normal respiratory effort.  Abdomen: Soft, NT, no tran hepatomegaly.  MSK/Ext: No clubbing or cyanosis.  Skin: Warm and dry, no rashes.  Neurological: CN III-XII grossly intact. No focal deficits.   Psych: A&O x 3, appropriate affect, good judgement    Physical Exam listed below was completed in entrety today and unchanged from 10-18-24 except where noted.  Some elements were copied from my note of that same day, which have been updated where appropriate and all reflect current meedical decision making from today.  I have confirmed and edited as necessary, the  PFSH and ROS obtained by others.      No follow-ups on file.    Written instructions given today:      -Zio patch heart monitor to evaluate the electrical system of the heart.  You can wear it for up to 2 weeks but you can also take it off early inside the back in the mail.    -Echocardiogram to look at the shape and structure of the heart pump.    -PVCs are premature ventricular contractions, funny little beats coming from the bottom part of the heart.  They can cause the sensation of a double beat followed by a pause and followed by a thump because there is more blood in the ventricle and the subsequent beat can feel more intense.    -They are typically harmless and can be suppressed with medications or magnesium.  Exercise is always good for PVCs.    -For magnesium replacement, I recommend any over the counter brand, generally a dose between 200-500 mg once daily.  Some people need to take it twice daily.  Some forms of magnesium can cause significant diarrhea/GI upset.  -Avoid magnesium citrate.  Watch out for the bottle saying that the magnesium is for bone and muscle health, it could still be magnesium citrate which is often used as a bowel prep.  -I typically recommend magnesium oxide or magnesium glycinate.    *Other options to try are atenolol, metoprolol to tartrate, metoprolol succinate, diltiazem/Cardizem, verapamil.    -I sent a prescription for atenolol 25 mg to be taken once daily, 30 pills with no refills.  If you want to try the atenolol, I recommend starting at the 12.5 mg.    -You will determine when and how you want to take beta-blockers.      It is my pleasure to participate in the care of Ms. Vicente.  Please do not hesitate to contact me with questions or concerns.    Xiomara Christopher MD, [facility]  Cardiologist, [facility]    Please note that this dictation was created using voice recognition software. I have made every reasonable attempt to correct obvious errors,  but it is possible there are errors of grammar and possibly content that I did not discover before finalizing the note.

## 2024-12-06 ENCOUNTER — APPOINTMENT (OUTPATIENT)
Dept: CARDIOLOGY | Facility: MEDICAL CENTER | Age: 39
End: 2024-12-06
Attending: INTERNAL MEDICINE
Payer: COMMERCIAL

## 2024-12-06 DIAGNOSIS — I49.3 PVC'S (PREMATURE VENTRICULAR CONTRACTIONS): ICD-10-CM

## 2024-12-06 DIAGNOSIS — R00.2 PALPITATIONS: ICD-10-CM

## 2025-02-11 ENCOUNTER — TELEPHONE (OUTPATIENT)
Dept: CARDIOLOGY | Facility: MEDICAL CENTER | Age: 40
End: 2025-02-11
Payer: COMMERCIAL

## 2025-02-11 NOTE — TELEPHONE ENCOUNTER
Eleno EOS to 's nurse, Eugenia, on 2/11/2025    Preliminary findings:    Sinus Rhythm  with an avg rate of 75 bpm    Isolated SVE(s) were rare    Isolated VE(s) were occasional (3.3%)    Ventricular bigeminy and trigeminy were present    2 patient events:  SR 88

## 2025-02-12 ENCOUNTER — RESULTS FOLLOW-UP (OUTPATIENT)
Dept: CARDIOLOGY | Facility: MEDICAL CENTER | Age: 40
End: 2025-02-12

## 2025-02-12 NOTE — TELEPHONE ENCOUNTER
Phone Number Called: [phone]    Call outcome: Spoke to patient regarding message below.    Message: Called to discuss- pt confirms seeing message from LS and will  medication, atenolol.    No further questions, appreciative of call    ======================  Xiomara Christopher M.D. to Me     2/11/25 11:43 AM  Result Note  Heart monitor looks good.  PVCs overall are considered occasional and 3.3%.  This is not a concern.  However, if the PVCs are bothersome, we can consider medical therapy for suppression to improve symptoms.  Medication would not be needed to treat a serious heart condition.  I have already sent a prescription for atenolol which she can try.

## 2025-02-23 ENCOUNTER — OFFICE VISIT (OUTPATIENT)
Dept: URGENT CARE | Facility: PHYSICIAN GROUP | Age: 40
End: 2025-02-23
Payer: COMMERCIAL

## 2025-02-23 ENCOUNTER — RESULTS FOLLOW-UP (OUTPATIENT)
Dept: URGENT CARE | Facility: PHYSICIAN GROUP | Age: 40
End: 2025-02-23

## 2025-02-23 ENCOUNTER — HOSPITAL ENCOUNTER (OUTPATIENT)
Facility: MEDICAL CENTER | Age: 40
End: 2025-02-23
Attending: STUDENT IN AN ORGANIZED HEALTH CARE EDUCATION/TRAINING PROGRAM
Payer: COMMERCIAL

## 2025-02-23 VITALS
SYSTOLIC BLOOD PRESSURE: 122 MMHG | WEIGHT: 149 LBS | TEMPERATURE: 98.2 F | BODY MASS INDEX: 25.44 KG/M2 | HEART RATE: 88 BPM | RESPIRATION RATE: 18 BRPM | HEIGHT: 64 IN | OXYGEN SATURATION: 99 % | DIASTOLIC BLOOD PRESSURE: 78 MMHG

## 2025-02-23 DIAGNOSIS — Z98.51 HISTORY OF BILATERAL TUBAL LIGATION: ICD-10-CM

## 2025-02-23 DIAGNOSIS — R10.9 ABDOMINAL CRAMPING: ICD-10-CM

## 2025-02-23 DIAGNOSIS — N92.1 MENORRHAGIA WITH IRREGULAR CYCLE: ICD-10-CM

## 2025-02-23 LAB
APPEARANCE UR: NORMAL
BILIRUB UR STRIP-MCNC: NORMAL MG/DL
C TRACH DNA GENITAL QL NAA+PROBE: NEGATIVE
CANDIDA DNA VAG QL PROBE+SIG AMP: NEGATIVE
COLOR UR AUTO: NORMAL
G VAGINALIS DNA VAG QL PROBE+SIG AMP: NEGATIVE
GLUCOSE UR STRIP.AUTO-MCNC: NEGATIVE MG/DL
KETONES UR STRIP.AUTO-MCNC: 15 MG/DL
LEUKOCYTE ESTERASE UR QL STRIP.AUTO: NORMAL
N GONORRHOEA DNA GENITAL QL NAA+PROBE: NEGATIVE
NITRITE UR QL STRIP.AUTO: POSITIVE
PH UR STRIP.AUTO: 5 [PH] (ref 5–8)
POCT INT CON NEG: NEGATIVE
POCT INT CON POS: POSITIVE
POCT URINE PREGNANCY TEST: NEGATIVE
PROT UR QL STRIP: >=300 MG/DL
RBC UR QL AUTO: NORMAL
SP GR UR STRIP.AUTO: 1.01
SPECIMEN SOURCE: NORMAL
T VAGINALIS DNA VAG QL PROBE+SIG AMP: NEGATIVE
UROBILINOGEN UR STRIP-MCNC: 4 MG/DL

## 2025-02-23 PROCEDURE — 87077 CULTURE AEROBIC IDENTIFY: CPT

## 2025-02-23 PROCEDURE — 87591 N.GONORRHOEAE DNA AMP PROB: CPT

## 2025-02-23 PROCEDURE — 3078F DIAST BP <80 MM HG: CPT | Performed by: STUDENT IN AN ORGANIZED HEALTH CARE EDUCATION/TRAINING PROGRAM

## 2025-02-23 PROCEDURE — 3074F SYST BP LT 130 MM HG: CPT | Performed by: STUDENT IN AN ORGANIZED HEALTH CARE EDUCATION/TRAINING PROGRAM

## 2025-02-23 PROCEDURE — 81025 URINE PREGNANCY TEST: CPT | Performed by: STUDENT IN AN ORGANIZED HEALTH CARE EDUCATION/TRAINING PROGRAM

## 2025-02-23 PROCEDURE — 99214 OFFICE O/P EST MOD 30 MIN: CPT | Performed by: STUDENT IN AN ORGANIZED HEALTH CARE EDUCATION/TRAINING PROGRAM

## 2025-02-23 PROCEDURE — 81002 URINALYSIS NONAUTO W/O SCOPE: CPT | Performed by: STUDENT IN AN ORGANIZED HEALTH CARE EDUCATION/TRAINING PROGRAM

## 2025-02-23 PROCEDURE — 87660 TRICHOMONAS VAGIN DIR PROBE: CPT

## 2025-02-23 PROCEDURE — 87491 CHLMYD TRACH DNA AMP PROBE: CPT

## 2025-02-23 PROCEDURE — 87510 GARDNER VAG DNA DIR PROBE: CPT

## 2025-02-23 PROCEDURE — 87480 CANDIDA DNA DIR PROBE: CPT

## 2025-02-23 PROCEDURE — 87086 URINE CULTURE/COLONY COUNT: CPT

## 2025-02-23 ASSESSMENT — ENCOUNTER SYMPTOMS
VOMITING: 0
SHORTNESS OF BREATH: 0
PALPITATIONS: 0
ABDOMINAL PAIN: 1
CONSTIPATION: 0
FLANK PAIN: 0
COUGH: 0
DIZZINESS: 0
BLOOD IN STOOL: 0
HEADACHES: 0
NAUSEA: 0
FEVER: 0
DIARRHEA: 1

## 2025-02-23 ASSESSMENT — FIBROSIS 4 INDEX: FIB4 SCORE: 0.84

## 2025-02-23 NOTE — PROGRESS NOTES
"Subjective     Peggy Vicente is a 39 y.o. female who presents with Abdominal Pain (Abdominal pain/cramping,  severe pain x 1 day /No nausea, vomiting)            Peggy is a 39 y.o. female who presents to urgent care with lower abdominal pain/cramping.  Patient states last night she developed abdominal pain/cramping on her right side.  Patient did not start her menstrual cycle and reports heavy menstrual bleeding.  Patient states that her periods have been more irregular.  She states she has also had heavy menstrual bleeding with her menstrual cycles.  Patient states when she has menstrual cycle she always gets abdominal cramping that is worse on the right side. States RLQ cramping is common with menstrual cycle and this is a re-occurring problem.  Patient concern for some type of ovarian cyst.  She does have history of tubal ligation.  She does also report some abdominal cramping with intercourse.  No UTI symptoms.  No vaginal discharge/odor.  No concern for STDs.  Patient did have one episode of diarrhea last night and reports having \"cold sweats.\" No nausea/vomiting.  No fever.        Review of Systems   Constitutional:  Negative for fever.   Respiratory:  Negative for cough and shortness of breath.    Cardiovascular:  Negative for chest pain and palpitations.   Gastrointestinal:  Positive for abdominal pain and diarrhea. Negative for blood in stool, constipation, nausea and vomiting.   Genitourinary:  Negative for dysuria, flank pain, frequency, hematuria and urgency.   Neurological:  Negative for dizziness and headaches.   All other systems reviewed and are negative.             Objective     /78 (BP Location: Left arm, Patient Position: Sitting, BP Cuff Size: Adult)   Pulse 88   Temp 36.8 °C (98.2 °F) (Temporal)   Resp 18   Ht 1.626 m (5' 4\")   Wt 67.6 kg (149 lb)   SpO2 99%   BMI 25.58 kg/m²      Physical Exam  Vitals reviewed.   Constitutional:       General: She is not in acute " distress.     Appearance: Normal appearance. She is not ill-appearing, toxic-appearing or diaphoretic.   HENT:      Head: Normocephalic and atraumatic.      Nose: Nose normal.   Eyes:      Extraocular Movements: Extraocular movements intact.      Conjunctiva/sclera: Conjunctivae normal.      Pupils: Pupils are equal, round, and reactive to light.   Cardiovascular:      Rate and Rhythm: Normal rate.   Pulmonary:      Effort: Pulmonary effort is normal.   Abdominal:      General: Abdomen is flat. There is no distension.      Palpations: Abdomen is soft.      Tenderness: There is abdominal tenderness in the right lower quadrant and suprapubic area. There is no right CVA tenderness, left CVA tenderness, guarding or rebound. Negative signs include McBurney's sign, psoas sign and obturator sign.   Skin:     General: Skin is warm and dry.   Neurological:      General: No focal deficit present.      Mental Status: She is alert and oriented to person, place, and time.                                  Assessment & Plan  Abdominal cramping    Orders:    POCT Pregnancy    POCT Urinalysis    VAGINAL PATHOGENS DNA PANEL; Future    Chlamydia/GC, PCR (Genital/Anal swab); Future    US-PELVIC COMPLETE (TRANSABDOMINAL/TRANSVAGINAL) (COMBO); Future    URINE CULTURE(NEW); Future    Menorrhagia with irregular cycle    Orders:    US-PELVIC COMPLETE (TRANSABDOMINAL/TRANSVAGINAL) (COMBO); Future    History of bilateral tubal ligation    Orders:    US-PELVIC COMPLETE (TRANSABDOMINAL/TRANSVAGINAL) (COMBO); Future         Differential diagnoses, supportive care measures (rest, OTC Tylenol/ibuprofen as needed) and indications for immediate follow-up discussed with patient. Pathogenesis of diagnosis discussed including typical length and natural progression.      Patient does have an appointment to establish with new primary care on 3/4/24. Advised to follow up as scheduled. ER precautions discussed with patient.    Instructed to return to  urgent care or nearest emergency department if symptoms fail to improve, for any change in condition, further concerns, or new concerning symptoms.    Patient states understanding and agrees with the plan of care and discharge instructions.               My total time spent caring for the patient on the day of the encounter was 45 minutes including obtaining patient history, physical exam, discussing differential diagnosis, plan of care, supportive care, appropriate follow-up, indications for immediate follow-up and addressing patient's questions/concerns. This does not include time spent on separately billable procedures/tests.

## 2025-02-23 NOTE — LETTER
February 23, 2025    To Whom It May Concern:         This is confirmation that Peggy Vicente attended her scheduled appointment with Hillary Coronel P.A.-C. on 2/23/25. Please excuse work absences through 2/25/25 for medical reasons. Peggy can return to work without restrictions on 2/26/25 or earlier as long as symptoms have improved/resolved.          Sincerely,    Hillary Coronel P.A.-C.  [phone]

## 2025-02-24 ENCOUNTER — APPOINTMENT (OUTPATIENT)
Dept: RADIOLOGY | Facility: MEDICAL CENTER | Age: 40
End: 2025-02-24
Attending: STUDENT IN AN ORGANIZED HEALTH CARE EDUCATION/TRAINING PROGRAM
Payer: COMMERCIAL

## 2025-02-24 DIAGNOSIS — N92.1 MENORRHAGIA WITH IRREGULAR CYCLE: ICD-10-CM

## 2025-02-24 DIAGNOSIS — Z98.51 HISTORY OF BILATERAL TUBAL LIGATION: ICD-10-CM

## 2025-02-24 DIAGNOSIS — R10.9 ABDOMINAL CRAMPING: ICD-10-CM

## 2025-02-24 PROCEDURE — 76830 TRANSVAGINAL US NON-OB: CPT

## 2025-02-24 NOTE — TELEPHONE ENCOUNTER
Fred oWods, I am sorry your pain as worsened. Due to worsening pain it is recommended you go to the ER for further evaluation and management.    Jered

## 2025-02-25 LAB
BACTERIA UR CULT: NORMAL
SIGNIFICANT IND 70042: NORMAL
SITE SITE: NORMAL
SOURCE SOURCE: NORMAL

## 2025-03-26 ENCOUNTER — APPOINTMENT (OUTPATIENT)
Dept: MEDICAL GROUP | Facility: PHYSICIAN GROUP | Age: 40
End: 2025-03-26
Payer: COMMERCIAL

## 2025-05-08 ENCOUNTER — HOSPITAL ENCOUNTER (EMERGENCY)
Facility: MEDICAL CENTER | Age: 40
End: 2025-05-08
Attending: EMERGENCY MEDICINE
Payer: COMMERCIAL

## 2025-05-08 ENCOUNTER — PHARMACY VISIT (OUTPATIENT)
Dept: PHARMACY | Facility: MEDICAL CENTER | Age: 40
End: 2025-05-08
Payer: COMMERCIAL

## 2025-05-08 VITALS
TEMPERATURE: 98.1 F | RESPIRATION RATE: 16 BRPM | HEART RATE: 94 BPM | BODY MASS INDEX: 25.47 KG/M2 | SYSTOLIC BLOOD PRESSURE: 131 MMHG | DIASTOLIC BLOOD PRESSURE: 96 MMHG | OXYGEN SATURATION: 100 % | WEIGHT: 148.37 LBS

## 2025-05-08 DIAGNOSIS — K08.89 PAIN, DENTAL: ICD-10-CM

## 2025-05-08 DIAGNOSIS — K02.9 DENTAL CARIES: ICD-10-CM

## 2025-05-08 PROCEDURE — RXMED WILLOW AMBULATORY MEDICATION CHARGE: Performed by: EMERGENCY MEDICINE

## 2025-05-08 PROCEDURE — 64400 NJX AA&/STRD TRIGEMINAL NRV: CPT

## 2025-05-08 PROCEDURE — A9270 NON-COVERED ITEM OR SERVICE: HCPCS | Performed by: EMERGENCY MEDICINE

## 2025-05-08 PROCEDURE — 700111 HCHG RX REV CODE 636 W/ 250 OVERRIDE (IP): Performed by: EMERGENCY MEDICINE

## 2025-05-08 PROCEDURE — 99283 EMERGENCY DEPT VISIT LOW MDM: CPT

## 2025-05-08 PROCEDURE — 700102 HCHG RX REV CODE 250 W/ 637 OVERRIDE(OP): Performed by: EMERGENCY MEDICINE

## 2025-05-08 RX ORDER — HYDROCODONE BITARTRATE AND ACETAMINOPHEN 5; 325 MG/1; MG/1
1 TABLET ORAL EVERY 6 HOURS PRN
Qty: 9 TABLET | Refills: 0 | Status: SHIPPED | OUTPATIENT
Start: 2025-05-08 | End: 2025-05-11

## 2025-05-08 RX ORDER — OXYCODONE AND ACETAMINOPHEN 5; 325 MG/1; MG/1
1 TABLET ORAL ONCE
Refills: 0 | Status: COMPLETED | OUTPATIENT
Start: 2025-05-08 | End: 2025-05-08

## 2025-05-08 RX ORDER — BUPIVACAINE HYDROCHLORIDE 5 MG/ML
5 INJECTION, SOLUTION EPIDURAL; INTRACAUDAL; PERINEURAL ONCE
Status: COMPLETED | OUTPATIENT
Start: 2025-05-08 | End: 2025-05-08

## 2025-05-08 RX ADMIN — OXYCODONE AND ACETAMINOPHEN 1 TABLET: 5; 325 TABLET ORAL at 18:59

## 2025-05-08 RX ADMIN — AMOXICILLIN AND CLAVULANATE POTASSIUM 1 TABLET: 875; 125 TABLET, FILM COATED ORAL at 18:59

## 2025-05-08 RX ADMIN — BUPIVACAINE HYDROCHLORIDE 5 ML: 5 INJECTION, SOLUTION EPIDURAL; INTRACAUDAL at 18:55

## 2025-05-08 ASSESSMENT — FIBROSIS 4 INDEX: FIB4 SCORE: 0.84

## 2025-05-09 NOTE — DISCHARGE INSTRUCTIONS
Do not drive, operate any machinery, or partake in dangerous activities that require maximum physical and mental performance while taking the prescribed pain killer. Do not take tylenol or tylenol containing products in addition to the pain killer that was prescibed, as the excess tylenol can cause life threatening liver problems. Do not combine this drug with alcohol or other sedatives or narcotics. Follow up with your primary care doctor in regards to the future management of this medication.        Follow up with your oral surgeon as scheduled next week

## 2025-05-09 NOTE — ED TRIAGE NOTES
Chief Complaint   Patient presents with    Dental Pain     Pt reports right upper dental pain. States that she was sent by her dentist.  Reports unable to tolerate pain with NSAIDS     BP (!) 131/96   Pulse (!) 113   Temp 36.7 °C (98.1 °F) (Temporal)   Resp 12   Wt 67.3 kg (148 lb 5.9 oz)   SpO2 99%   Pt informed of wait times. Educated on triage process.  Asked to return to triage RN for any new or worsening of symptoms. Thanked for patience.

## 2025-05-09 NOTE — ED NOTES
Pt d/c from ED a/o x 4 GCS 15 ambulatory without assistance with steady gait. Pt given d/c instructions and verbalized understanding. Pt refused d/c blood pressure.

## 2025-05-21 ENCOUNTER — GYNECOLOGY VISIT (OUTPATIENT)
Dept: GYNECOLOGY | Facility: CLINIC | Age: 40
End: 2025-05-21
Payer: COMMERCIAL

## 2025-05-21 VITALS
BODY MASS INDEX: 24.66 KG/M2 | WEIGHT: 148 LBS | SYSTOLIC BLOOD PRESSURE: 139 MMHG | HEIGHT: 65 IN | DIASTOLIC BLOOD PRESSURE: 96 MMHG | HEART RATE: 53 BPM

## 2025-05-21 DIAGNOSIS — N93.9 ABNORMAL UTERINE BLEEDING (AUB): Primary | ICD-10-CM

## 2025-05-21 PROCEDURE — 3075F SYST BP GE 130 - 139MM HG: CPT | Performed by: STUDENT IN AN ORGANIZED HEALTH CARE EDUCATION/TRAINING PROGRAM

## 2025-05-21 PROCEDURE — 3080F DIAST BP >= 90 MM HG: CPT | Performed by: STUDENT IN AN ORGANIZED HEALTH CARE EDUCATION/TRAINING PROGRAM

## 2025-05-21 PROCEDURE — 99459 PELVIC EXAMINATION: CPT | Performed by: STUDENT IN AN ORGANIZED HEALTH CARE EDUCATION/TRAINING PROGRAM

## 2025-05-21 PROCEDURE — 99204 OFFICE O/P NEW MOD 45 MIN: CPT | Performed by: STUDENT IN AN ORGANIZED HEALTH CARE EDUCATION/TRAINING PROGRAM

## 2025-05-21 ASSESSMENT — FIBROSIS 4 INDEX: FIB4 SCORE: 0.84

## 2025-05-21 NOTE — PROGRESS NOTES
Minimally Invasive Gynecologic Surgery Consult       CC: LINCOLN SAAVEDRA  Peggy Anali Vicente is a 39 y.o. female  presenting today for the above.  Patient reports prolonged and heavy menstrual bleeding for the last 2 to 3 months.  Always had heavy periods with clots, however they were regular and lasting up to a month.  2 to 3 months ago she started bleeding continuously and has not stopped since.  Passing blood clots and having significant dysmenorrhea.  She took Provera but did not have significant relief of her symptoms.  Also had a levonorgestrel IUD in the past, had recurrent vaginitis and discomfort with intercourse.  Is reluctant to try estrogen due to possible risk of blood clots.  She denies dyspareunia, bladder or bowel issues.  Pelvic ultrasound done, suspect adenomyosis.  Also with a 3 cm simple right cyst on the ovary.  She reports occasional pain in her right lower quadrant.  Desires to proceed with surgical management.    Surgical history notable for laparoscopic BTL, abdominoplasty, breast augmentation    Imaging  Pelvic US : IMPRESSION:     1.  There is a 2.8 cm simple cyst in the right ovary.     2.  Unremarkable uterus and left ovary.    Menstrual History  Patient's last menstrual period was 2025 (exact date).  See above    Gynecologic History  Last pap:  normal per pt  Hx abnormal pap smears: no  Hx of PID/STDs: HSV, HPV  Contraception plan: BTL      OB History    Para Term  AB Living   3 2 2  1 2   SAB IAB Ectopic Molar Multiple Live Births   1     2      # Outcome Date GA Lbr Alex/2nd Weight Sex Type Anes PTL Lv   3 Term 14    M    VICTOR HUGO   2 Term 01/11/10    M Vag-Spont   VICTOR HUGO   1 SAB                Past Medical History  Past Medical History[1]    Past Surgical History  Past Surgical History[2]    Social History  Social History[3]     Family History  Family History   Problem Relation Age of Onset    Breast Cancer Maternal Aunt        Home  "Medications  Current Outpatient Medications   Medication Sig    pregabalin (LYRICA) 100 MG Cap TAKE ONE CAPSULE BY MOUTH DAILY FOR 30 DAYS    ALPRAZolam (XANAX) 0.5 MG Tab Take 1 Tablet by mouth 1 time a day as needed.    tizanidine (ZANAFLEX) 4 MG Tab Take 1 Tab by mouth every 6 hours as needed.    BuPROPion HCl (WELLBUTRIN PO) Take  by mouth.    celecoxib (CELEBREX) 200 MG Cap TAKE ONE CAPSULE BY MOUTH DAILY FOR 30 DAYS (Patient not taking: Reported on 2/23/2025)    atenolol (TENORMIN) 25 MG Tab Take 1 Tablet by mouth every day. (Patient not taking: Reported on 2/23/2025)       Allergies/Reactions  Allergies[4]       ROS: I have reviewed all systems with patient. Pertinent positive and negative findings are listed below except for what is otherwise stated in the History of Present Illness.       Objective:    Labs  Lab Results   Component Value Date/Time    WBC 4.6 (L) 06/24/2024 08:19 AM    RBC 4.16 (L) 06/24/2024 08:19 AM    HEMOGLOBIN 12.2 06/24/2024 08:19 AM    HEMATOCRIT 38.1 06/24/2024 08:19 AM    MCV 91.6 06/24/2024 08:19 AM    MCH 29.3 06/24/2024 08:19 AM    MCHC 32.0 (L) 06/24/2024 08:19 AM    RDW 42.5 06/24/2024 08:19 AM    PLATELETCT 236 06/24/2024 08:19 AM    MPV 11.6 06/24/2024 08:19 AM    NEUTSPOLYS 42.00 (L) 06/24/2024 08:19 AM    LYMPHOCYTES 43.40 (H) 06/24/2024 08:19 AM    MONOCYTES 10.90 06/24/2024 08:19 AM    EOSINOPHILS 2.00 06/24/2024 08:19 AM    BASOPHILS 1.50 06/24/2024 08:19 AM    IMMGRAN 0.20 06/24/2024 08:19 AM    NRBC 0.00 06/24/2024 08:19 AM    NEUTS 1.93 06/24/2024 08:19 AM    LYMPHS 1.99 06/24/2024 08:19 AM    MONOS 0.50 06/24/2024 08:19 AM    EOS 0.09 06/24/2024 08:19 AM    BASO 0.07 06/24/2024 08:19 AM    IMMGRANAB 0.01 06/24/2024 08:19 AM    NRBCAB 0.00 06/24/2024 08:19 AM       No results found for: \"HBA1C\"      Physical Exam  BP (!) 139/96 (BP Location: Right arm, Patient Position: Sitting, BP Cuff Size: Adult)   Pulse (!) 53   Ht 5' 5\"   Wt 148 lb   LMP 05/21/2025 (Exact " Date)   BMI 24.63 kg/m²    Patient's last menstrual period was 2025 (exact date).  Body mass index is 24.63 kg/m².    General: alert, well appearing, and in no distress  Neurological: alert, oriented, normal speech, no focal findings or movement disorder noted  Respiratory: no tachypnea, retractions or cyanosis  Abdominal: soft, nontender, nondistended, no masses or organomegaly    Pelvic exam:   external genitalia: normal appearance  urinary system: urethral meatus normal  vaginal: normal mucosa without prolapse or lesions  cervix: normal appearance  Bimanual exam deferred  Blood clots coming from cervical os    Female chaperone present - see MA note         Assessment:  39-year-old P2 ( x2)  Abnormal uterine bleeding, prolonged periods, dysmenorrhea  Failed medical management  Ultrasound suggestive of adenomyosis  Completed fertility  Desires surgical management  3cm  simple right ovarian cyst, occasional right lower quadrant pain     Plan:  1. Abnormal uterine bleeding (AUB)  We reviewed the differential diagnosis of abnormal uterine bleeding (structural, hormonal, coagulative, and unknown). I reviewed treatment options which include: medical management with OCPs, oral/IM progesterone, LNG-IUD, or Depo-Lupron; and surgical options with  hysterectomy. We reviewed the R/B/A of each of these. After thorough counseling and shared decision-making, patient opted to proceed with hysterectomy.  Will plan to do blood work to rule out hormonal causes of her bleeding.  Tentative plan for Total laparoscopic hysterectomy, bilateral salpingectomy, possible right ovarian cystectomy,  cystoscopy.  Needs preop virtual visit with me.      - FE+TIBC+MAKENNA+TRANSF  - CBC WITHOUT DIFFERENTIAL; Future  - PROLACTIN; Future  - TSH WITH REFLEX TO FT4; Future         Brigid Almonte MD         [1]   Past Medical History:  Diagnosis Date    Neuropathy    [2]   Past Surgical History:  Procedure Laterality Date    TUBAL LIGATION      [3]   Social History  Tobacco Use    Smoking status: Former     Types: Cigarettes    Smokeless tobacco: Never   Vaping Use    Vaping status: Every Day   Substance Use Topics    Alcohol use: Not Currently     Comment: occ    Drug use: No   [4]   Allergies  Allergen Reactions    Sertraline Anxiety

## 2025-05-22 ENCOUNTER — HOSPITAL ENCOUNTER (OUTPATIENT)
Facility: MEDICAL CENTER | Age: 40
End: 2025-05-22
Attending: STUDENT IN AN ORGANIZED HEALTH CARE EDUCATION/TRAINING PROGRAM
Payer: COMMERCIAL

## 2025-05-22 DIAGNOSIS — N93.9 ABNORMAL UTERINE BLEEDING (AUB): ICD-10-CM

## 2025-05-22 LAB
ERYTHROCYTE [DISTWIDTH] IN BLOOD BY AUTOMATED COUNT: 42.1 FL (ref 35.9–50)
FERRITIN SERPL-MCNC: 9.6 NG/ML (ref 10–291)
HCT VFR BLD AUTO: 33.8 % (ref 37–47)
HGB BLD-MCNC: 10.5 G/DL (ref 12–16)
IRON SATN MFR SERPL: 8 % (ref 15–55)
IRON SERPL-MCNC: 31 UG/DL (ref 40–170)
MCH RBC QN AUTO: 26.5 PG (ref 27–33)
MCHC RBC AUTO-ENTMCNC: 31.1 G/DL (ref 32.2–35.5)
MCV RBC AUTO: 85.4 FL (ref 81.4–97.8)
PLATELET # BLD AUTO: 302 K/UL (ref 164–446)
PMV BLD AUTO: 11.5 FL (ref 9–12.9)
PROLACTIN SERPL-MCNC: 24.9 NG/ML (ref 2.8–26)
RBC # BLD AUTO: 3.96 M/UL (ref 4.2–5.4)
TIBC SERPL-MCNC: 413 UG/DL (ref 250–450)
TSH SERPL DL<=0.005 MIU/L-ACNC: 1.57 UIU/ML (ref 0.38–5.33)
UIBC SERPL-MCNC: 382 UG/DL (ref 110–370)
WBC # BLD AUTO: 5.3 K/UL (ref 4.8–10.8)

## 2025-05-22 PROCEDURE — 83550 IRON BINDING TEST: CPT

## 2025-05-22 PROCEDURE — 83540 ASSAY OF IRON: CPT

## 2025-05-22 PROCEDURE — 84146 ASSAY OF PROLACTIN: CPT

## 2025-05-22 PROCEDURE — 85027 COMPLETE CBC AUTOMATED: CPT

## 2025-05-22 PROCEDURE — 84443 ASSAY THYROID STIM HORMONE: CPT

## 2025-05-22 PROCEDURE — 82728 ASSAY OF FERRITIN: CPT

## 2025-05-23 ENCOUNTER — RESULTS FOLLOW-UP (OUTPATIENT)
Dept: GYNECOLOGY | Facility: CLINIC | Age: 40
End: 2025-05-23
Payer: COMMERCIAL

## 2025-05-27 ENCOUNTER — APPOINTMENT (OUTPATIENT)
Dept: ADMISSIONS | Facility: MEDICAL CENTER | Age: 40
End: 2025-05-27
Attending: STUDENT IN AN ORGANIZED HEALTH CARE EDUCATION/TRAINING PROGRAM
Payer: COMMERCIAL

## 2025-05-27 ENCOUNTER — TELEMEDICINE (OUTPATIENT)
Dept: GYNECOLOGY | Facility: CLINIC | Age: 40
End: 2025-05-27
Payer: COMMERCIAL

## 2025-05-27 DIAGNOSIS — N93.9 ABNORMAL UTERINE BLEEDING (AUB): Primary | ICD-10-CM

## 2025-05-27 NOTE — PROGRESS NOTES
This evaluation was conducted via [employer] using secure and encrypted videoconferencing technology. The patient was in a private location outside of their home in the state of [place].    The patient's identity was confirmed and verbal consent was obtained for this virtual visit.      Preoperative Visit - Minimally Invasive Gynecologic Surgery     CC: Preop visit    HPI: Here to discuss hysterectomy.  No specific concerns or complaints.      ROS: I have reviewed all systems with patient. Pertinent positive and negative findings are listed below except for what is otherwise stated in the History of Present Illness.     Physical Exam:    Virtual visit     Assessment:    39-year-old P2 ( x2)  Abnormal uterine bleeding, prolonged periods, dysmenorrhea  Failed medical management  Ultrasound suggestive of adenomyosis  Completed fertility  Desires surgical management  3cm  simple right ovarian cyst, occasional right lower quadrant pain    Plan:  Total laparoscopic hysterectomy, bilateral salpingectomy, possible right ovarian cystectomy,  cystoscopy.     Surgery counseling  After detailed counseling about all treatment options and shared decision-making, patient opts to proceed with surgery. Discussed all risks of surgery including but not limited to infection, bleeding, damage to bowel, bladder, ureters, vessels, nerves, conversion to laparotomy, vaginal cuff dehiscence. Hysterectomy is a sterilization procedure and she will not be able to become pregnant after this surgery.     Specifically she was counselled as to what to expect on the day of surgery. She will likely go home on the same day after the surgery.  Discussed trajectory of recovery, including maximizing NSAIDs and Tylenol. Discussed restrictions including heavy lifting that requires straining, driving while on narcotics, nothing in the vagina and no bathing/swimming for at least 6 weeks until evaluated in the office .  Return to work after 6 weeks  recommended. She has expressed understanding about the procedure, risks, benefits, and recovery.            Brigid Almonte MD

## 2025-06-06 ENCOUNTER — PRE-ADMISSION TESTING (OUTPATIENT)
Dept: ADMISSIONS | Facility: MEDICAL CENTER | Age: 40
End: 2025-06-06
Attending: STUDENT IN AN ORGANIZED HEALTH CARE EDUCATION/TRAINING PROGRAM
Payer: COMMERCIAL

## 2025-06-06 ENCOUNTER — TELEPHONE (OUTPATIENT)
Dept: OBGYN | Facility: CLINIC | Age: 40
End: 2025-06-06
Payer: COMMERCIAL

## 2025-06-06 RX ORDER — IBUPROFEN 200 MG
800 TABLET ORAL EVERY 6 HOURS PRN
COMMUNITY

## 2025-06-06 RX ORDER — BUPROPION HYDROCHLORIDE 150 MG/1
150 TABLET, EXTENDED RELEASE ORAL 2 TIMES DAILY
COMMUNITY

## 2025-06-06 RX ORDER — DICYCLOMINE HYDROCHLORIDE 10 MG/1
1 CAPSULE ORAL PRN
COMMUNITY
Start: 2025-04-17

## 2025-06-06 RX ORDER — ACYCLOVIR 400 MG/1
400 TABLET ORAL 2 TIMES DAILY PRN
COMMUNITY

## 2025-06-06 RX ORDER — PREGABALIN 200 MG/1
200 CAPSULE ORAL 2 TIMES DAILY
COMMUNITY

## 2025-06-06 NOTE — TELEPHONE ENCOUNTER
Called and spoke with pt regarding MyChart message for FMLA and to clarify start date. Pt states day of surgery 06.26.25 for FMLA start date. Pt would like to have 2 sweeks leave with 1 week work form home. Informed pt that I will speak to provider regarding req.

## 2025-06-06 NOTE — PREADMIT AVS NOTE
Current Medications   Medication Instructions    dicyclomine (BENTYL) 10 MG Cap As needed medication, may take if needed, including morning of procedure     ibuprofen (MOTRIN) 200 MG Tab Stop 5 days before surgery    pregabalin (LYRICA) 200 MG capsule Continue taking medication as prescribed, including morning of procedure     buPROPion SR (WELLBUTRIN-SR) 150 MG TABLET SR 12 HR sustained-release tablet Continue taking medication as prescribed, including morning of procedure     acyclovir (ZOVIRAX) 400 MG tablet Follow instructions from surgeon or specialist.    ALPRAZolam (XANAX) 0.5 MG Tab As needed medication, may take if needed, including morning of procedure     celecoxib (CELEBREX) 200 MG Cap Stop 5 days before surgery    tizanidine (ZANAFLEX) 4 MG Tab As needed medication, may take if needed, including morning of procedure

## 2025-06-10 ENCOUNTER — TELEPHONE (OUTPATIENT)
Dept: OBGYN | Facility: CLINIC | Age: 40
End: 2025-06-10
Payer: COMMERCIAL

## 2025-06-23 NOTE — DISCHARGE INSTRUCTIONS
Major laparoscopic surgery     Post-op: What to expect after your surgery    For less-urgent matters (Monday - Friday), you may send a message through [employer] or call the general Women's Health line at [phone].     For urgent/emergent post-operative questions or concerns which cannot wait until the next business day, please call  the Gyn Subspecialties on call line at [phone]. You will be directed to the doctor on call.     Bladder function  Try to empty your bladder (urinate) at regular intervals by sitting on the toilet and relaxing.  You may need to adjust your positioning (lean forward or back) to empty the bladder fully. It is important that you do not push or strain to empty your bladder.     Call the surgeon at the number above if you cannot urinate. Also, call for treatment if you have signs and symptoms of a urinary tract infection, including:   Burning with urination  Bladder pain  Worsening need to urinate right away  Urine with a bad smell    If you are sent home with a catheter:   Empty the catheter bag when it becomes full. The bag should be kept at a level below your hips to drain properly. When you are asleep, the bag should dangle off the bed. and should dangle off of the bed while you are asleep. You will be called the day after you go home to schedule an office visit to test your bladder and remove the catheter.     Vaginal care:   Do not go swimming, take sitting baths, or have sexual intercourse for 8 weeks after surgery. Do not place anything in the vagina except vaginal estrogen cream, if instructed to do so by your surgeon.     Vaginal discharge and bleeding/spotting is also normal through the entire 8-week recovery. Sometimes small sutures will fall out of the vagina as they dissolve. This is normal. Contact the office with any heavy bleeding soaking through pads, bad-smelling discharge, or worsening pain.     Pain management:  Surgical pain is controlled in most patients with only  non-steroidal anti-inflammatory drugs (NSAIDs, such as ibuprofen, “Advil”), and acetaminophen (Tylenol). These drugs can be taken together without interaction.     In the hospital, your nurse will give you these medications at regular intervals to both treat and to prevent pain. If these are not controlling your pain, you may ask the nurse for additional medication.     When you go home, you will also take NSAIDs and acetaminophen for pain management. I recommend the following at-home pain regimen:    Take ibuprofen 600mg every 6 hours, along with tylenol 500mg every 6 hours for the first 5 days   Take Oxycodone 5mg every 6 hours as needed for severe pain  Take Miralax daily for the first 3-5 days to prevent constipation    Sometimes, a short course of narcotics such as oxycodone and hydromorphone is  required, but this is not routine. We do not recommend using narcotics regularly as it can lead to constipation or dizziness, and falls.     Do not drive or operate heavy machinery while using narcotics. You are unlikely to become addicted if you need to take a narcotic medication a few times within the first week of your surgery.  After the first few days to one week, your pain should decrease and you should not have pain severe enough to need narcotics. If you continue having severe pain, contact your surgeon for re-evaluation.     Abdominal wound care  The incisions on your abdomen will be closed with either small bandages or a surgical glue. There are also tiny dissolving sutures beneath the skin. You can shower with these in place. In the shower, let the soapy water run over your incisions. Do not scrub or wipe your incisions. Keep the incisions dry for the remainder of the day/night. The bandages will fall off on their own, or you can remove them after at least 3 days if they become discolored or dirty. The glue will also fall off on its own after a few weeks. Small sutures that pop out through the skin are  normal and will dissolve over time.    Call us if you feel increasing pain, redness, discharge or warmth at the incision.     Bowel function  Constipation is common after surgery. This means it may take several days before having a normal bowel movement. It is important to take extra steps to keep your stools soft to avoid straining with bowel movements. Straining may damage the prolapse repair before it has healed. Most patients will be given a prescription for a stool softener (docusate) as well as a gentle laxative (Miralax or lactulose). These mediations adds water to the stool to make it easier to pass. Take them daily throughout your recovery. Hold for a day if you develop diarrhea.     Call us if you experience any repeated episodes of vomiting, worsening abdominal pain/bloating, or are unable to have a bowel movement for more than 3 days.     Activity restrictions  During the first 6 weeks avoid any type of heavy lifting that requires you to strain.  Gentle walking is good exercise. Start with about 10 minutes a day when you feel ready and build up gradually. Avoid repetitive squatting or bending at the waist.Avoid any fitness-type training, aerobics, etc. for at least 6 weeks after surgery. Generally, you will need 4-6 weeks off work. This period may be longer if you have a very physical job.    Return to sex  When your surgeon clears you to resume sex (if desired), begin slowly and to use enough lubrication to help ease the discomfort. Because your vagina and pelvis have been re-structured, and it can take time to get used to sex after surgery. As scar tissue softens over time you will feel less discomfort. If discomfort does not go away, contact the office to schedule an exam and to discuss other options. In some cases, your surgeon may prescribe a low dose of vaginal estrogen to help with vaginal dryness and pain that may happen with sex.        What to Expect Post Anesthesia    Rest and take it easy for  the first 24 hours.  A responsible adult is recommended to remain with you during that time.  It is normal to feel sleepy.  We encourage you to not do anything that requires balance, judgment or coordination.    FOR 24 HOURS DO NOT:  Drive, operate machinery or run household appliances.  Drink beer or alcoholic beverages.  Make important decisions or sign legal documents.    To avoid nausea, slowly advance diet as tolerated, avoiding spicy or greasy foods for the first day.  Add more substantial food to your diet according to your provider's instructions.  Babies can be fed formula or breast milk as soon as they are hungry.  INCREASE FLUIDS AND FIBER TO AVOID CONSTIPATION.    MILD FLU-LIKE SYMPTOMS ARE NORMAL.  YOU MAY EXPERIENCE GENERALIZED MUSCLE ACHES, THROAT IRRITATION, HEADACHE AND/OR SOME NAUSEA.    If any questions arise, call your provider.  If your provider is not available, please feel free to call the Surgical Center at [phone].    MEDICATIONS: Resume taking daily medication.  Take prescribed pain medication with food.  If no medication is prescribed, you may take non-aspirin pain medication if needed.  PAIN MEDICATION CAN BE VERY CONSTIPATING.  Take a stool softener or laxative such as senokot, pericolace, or milk of magnesia if needed.    Last pain medication given 1000mg Tylenol, 10mg Oxycontin, Celebrex (NSAID, similar to ibuprofen) and pyridium at 7am.  May take tylenol and ibuprofen next at 1pm.    10mg Oxycodone given at 10:15am, may take additional dose at 4pm    Please remove scopolamine patch behind your ear within 72 hours or sooner.  Wash hands and behind ear after removing.

## 2025-06-26 ENCOUNTER — ANESTHESIA EVENT (OUTPATIENT)
Dept: SURGERY | Facility: MEDICAL CENTER | Age: 40
End: 2025-06-26
Payer: COMMERCIAL

## 2025-06-26 ENCOUNTER — ANESTHESIA (OUTPATIENT)
Dept: SURGERY | Facility: MEDICAL CENTER | Age: 40
End: 2025-06-26
Payer: COMMERCIAL

## 2025-06-26 ENCOUNTER — HOSPITAL ENCOUNTER (OUTPATIENT)
Facility: MEDICAL CENTER | Age: 40
End: 2025-06-26
Attending: STUDENT IN AN ORGANIZED HEALTH CARE EDUCATION/TRAINING PROGRAM | Admitting: STUDENT IN AN ORGANIZED HEALTH CARE EDUCATION/TRAINING PROGRAM
Payer: COMMERCIAL

## 2025-06-26 ENCOUNTER — PHARMACY VISIT (OUTPATIENT)
Dept: PHARMACY | Facility: MEDICAL CENTER | Age: 40
End: 2025-06-26
Payer: COMMERCIAL

## 2025-06-26 VITALS
HEART RATE: 71 BPM | DIASTOLIC BLOOD PRESSURE: 79 MMHG | BODY MASS INDEX: 25.1 KG/M2 | SYSTOLIC BLOOD PRESSURE: 148 MMHG | TEMPERATURE: 98.3 F | HEIGHT: 64 IN | WEIGHT: 147 LBS | RESPIRATION RATE: 16 BRPM | OXYGEN SATURATION: 100 %

## 2025-06-26 DIAGNOSIS — G89.18 POSTOPERATIVE PAIN: Primary | ICD-10-CM

## 2025-06-26 PROBLEM — N93.9 ABNORMAL UTERINE BLEEDING (AUB): Status: ACTIVE | Noted: 2025-06-26

## 2025-06-26 PROBLEM — N83.202 LEFT OVARIAN CYST: Status: ACTIVE | Noted: 2025-06-26

## 2025-06-26 PROBLEM — N80.9 ENDOMETRIOSIS: Status: ACTIVE | Noted: 2025-06-26

## 2025-06-26 LAB
HCG UR QL: NEGATIVE
PATHOLOGY CONSULT NOTE: NORMAL

## 2025-06-26 PROCEDURE — 160193 HCHG PACU STANDARD - 1ST 60 MINS: Performed by: STUDENT IN AN ORGANIZED HEALTH CARE EDUCATION/TRAINING PROGRAM

## 2025-06-26 PROCEDURE — 160048 HCHG OR STATISTICAL LEVEL 1-5: Performed by: STUDENT IN AN ORGANIZED HEALTH CARE EDUCATION/TRAINING PROGRAM

## 2025-06-26 PROCEDURE — 160002 HCHG RECOVERY MINUTES (STAT): Performed by: STUDENT IN AN ORGANIZED HEALTH CARE EDUCATION/TRAINING PROGRAM

## 2025-06-26 PROCEDURE — 700102 HCHG RX REV CODE 250 W/ 637 OVERRIDE(OP)

## 2025-06-26 PROCEDURE — 58571 TLH W/T/O 250 G OR LESS: CPT | Performed by: STUDENT IN AN ORGANIZED HEALTH CARE EDUCATION/TRAINING PROGRAM

## 2025-06-26 PROCEDURE — 110371 HCHG SHELL REV 272: Performed by: STUDENT IN AN ORGANIZED HEALTH CARE EDUCATION/TRAINING PROGRAM

## 2025-06-26 PROCEDURE — 700105 HCHG RX REV CODE 258: Performed by: STUDENT IN AN ORGANIZED HEALTH CARE EDUCATION/TRAINING PROGRAM

## 2025-06-26 PROCEDURE — 81025 URINE PREGNANCY TEST: CPT

## 2025-06-26 PROCEDURE — 700111 HCHG RX REV CODE 636 W/ 250 OVERRIDE (IP)

## 2025-06-26 PROCEDURE — 88305 TISSUE EXAM BY PATHOLOGIST: CPT | Performed by: PATHOLOGY

## 2025-06-26 PROCEDURE — 96374 THER/PROPH/DIAG INJ IV PUSH: CPT

## 2025-06-26 PROCEDURE — 770030 HCHG ROOM/CARE - EXTENDED RECOVERY EACH 15 MIN

## 2025-06-26 PROCEDURE — 700101 HCHG RX REV CODE 250: Performed by: STUDENT IN AN ORGANIZED HEALTH CARE EDUCATION/TRAINING PROGRAM

## 2025-06-26 PROCEDURE — 160015 HCHG STAT PREOP MINUTES: Performed by: STUDENT IN AN ORGANIZED HEALTH CARE EDUCATION/TRAINING PROGRAM

## 2025-06-26 PROCEDURE — RXMED WILLOW AMBULATORY MEDICATION CHARGE: Performed by: STUDENT IN AN ORGANIZED HEALTH CARE EDUCATION/TRAINING PROGRAM

## 2025-06-26 PROCEDURE — 88305 TISSUE EXAM BY PATHOLOGIST: CPT | Mod: 26 | Performed by: PATHOLOGY

## 2025-06-26 PROCEDURE — 700111 HCHG RX REV CODE 636 W/ 250 OVERRIDE (IP): Performed by: STUDENT IN AN ORGANIZED HEALTH CARE EDUCATION/TRAINING PROGRAM

## 2025-06-26 PROCEDURE — 88309 TISSUE EXAM BY PATHOLOGIST: CPT | Performed by: PATHOLOGY

## 2025-06-26 PROCEDURE — 700111 HCHG RX REV CODE 636 W/ 250 OVERRIDE (IP): Mod: JZ | Performed by: STUDENT IN AN ORGANIZED HEALTH CARE EDUCATION/TRAINING PROGRAM

## 2025-06-26 PROCEDURE — 58571 TLH W/T/O 250 G OR LESS: CPT | Mod: AS | Performed by: STUDENT IN AN ORGANIZED HEALTH CARE EDUCATION/TRAINING PROGRAM

## 2025-06-26 PROCEDURE — 700102 HCHG RX REV CODE 250 W/ 637 OVERRIDE(OP): Performed by: STUDENT IN AN ORGANIZED HEALTH CARE EDUCATION/TRAINING PROGRAM

## 2025-06-26 PROCEDURE — A9270 NON-COVERED ITEM OR SERVICE: HCPCS

## 2025-06-26 PROCEDURE — 88309 TISSUE EXAM BY PATHOLOGIST: CPT | Mod: 26 | Performed by: PATHOLOGY

## 2025-06-26 PROCEDURE — A9270 NON-COVERED ITEM OR SERVICE: HCPCS | Performed by: STUDENT IN AN ORGANIZED HEALTH CARE EDUCATION/TRAINING PROGRAM

## 2025-06-26 PROCEDURE — 51798 US URINE CAPACITY MEASURE: CPT

## 2025-06-26 PROCEDURE — 160029 HCHG SURGERY MINUTES - 1ST 30 MINS LEVEL 4: Performed by: STUDENT IN AN ORGANIZED HEALTH CARE EDUCATION/TRAINING PROGRAM

## 2025-06-26 PROCEDURE — 160041 HCHG SURGERY MINUTES - EA ADDL 1 MIN LEVEL 4: Performed by: STUDENT IN AN ORGANIZED HEALTH CARE EDUCATION/TRAINING PROGRAM

## 2025-06-26 PROCEDURE — 160191 HCHG ANESTHESIA STANDARD: Performed by: STUDENT IN AN ORGANIZED HEALTH CARE EDUCATION/TRAINING PROGRAM

## 2025-06-26 RX ORDER — ACETAMINOPHEN 500 MG
1000 TABLET ORAL EVERY 6 HOURS
Status: DISCONTINUED | OUTPATIENT
Start: 2025-06-26 | End: 2025-06-26 | Stop reason: HOSPADM

## 2025-06-26 RX ORDER — DIPHENHYDRAMINE HYDROCHLORIDE 50 MG/ML
12.5 INJECTION, SOLUTION INTRAMUSCULAR; INTRAVENOUS
Status: DISCONTINUED | OUTPATIENT
Start: 2025-06-26 | End: 2025-06-26 | Stop reason: HOSPADM

## 2025-06-26 RX ORDER — OXYCODONE HCL 5 MG/5 ML
10 SOLUTION, ORAL ORAL
Status: COMPLETED | OUTPATIENT
Start: 2025-06-26 | End: 2025-06-26

## 2025-06-26 RX ORDER — OXYCODONE HYDROCHLORIDE 5 MG/1
5 TABLET ORAL EVERY 6 HOURS PRN
Qty: 10 TABLET | Refills: 0 | Status: SHIPPED | OUTPATIENT
Start: 2025-06-26 | End: 2025-06-27 | Stop reason: SDUPTHER

## 2025-06-26 RX ORDER — OXYCODONE HCL 5 MG/5 ML
5 SOLUTION, ORAL ORAL EVERY 6 HOURS PRN
Status: DISCONTINUED | OUTPATIENT
Start: 2025-06-26 | End: 2025-06-26 | Stop reason: HOSPADM

## 2025-06-26 RX ORDER — ALBUTEROL SULFATE 5 MG/ML
2.5 SOLUTION RESPIRATORY (INHALATION)
Status: DISCONTINUED | OUTPATIENT
Start: 2025-06-26 | End: 2025-06-26 | Stop reason: HOSPADM

## 2025-06-26 RX ORDER — METRONIDAZOLE 500 MG/100ML
INJECTION, SOLUTION INTRAVENOUS PRN
Status: DISCONTINUED | OUTPATIENT
Start: 2025-06-26 | End: 2025-06-26 | Stop reason: SURG

## 2025-06-26 RX ORDER — HEPARIN SODIUM 5000 [USP'U]/ML
INJECTION, SOLUTION INTRAVENOUS; SUBCUTANEOUS
Status: COMPLETED
Start: 2025-06-26 | End: 2025-06-26

## 2025-06-26 RX ORDER — EPHEDRINE SULFATE 50 MG/ML
5 INJECTION, SOLUTION INTRAVENOUS
Status: DISCONTINUED | OUTPATIENT
Start: 2025-06-26 | End: 2025-06-26 | Stop reason: HOSPADM

## 2025-06-26 RX ORDER — DEXAMETHASONE SODIUM PHOSPHATE 4 MG/ML
INJECTION, SOLUTION INTRA-ARTICULAR; INTRALESIONAL; INTRAMUSCULAR; INTRAVENOUS; SOFT TISSUE PRN
Status: DISCONTINUED | OUTPATIENT
Start: 2025-06-26 | End: 2025-06-26 | Stop reason: SURG

## 2025-06-26 RX ORDER — LIDOCAINE HYDROCHLORIDE 20 MG/ML
INJECTION, SOLUTION EPIDURAL; INFILTRATION; INTRACAUDAL; PERINEURAL PRN
Status: DISCONTINUED | OUTPATIENT
Start: 2025-06-26 | End: 2025-06-26 | Stop reason: SURG

## 2025-06-26 RX ORDER — CELECOXIB 200 MG/1
200 CAPSULE ORAL ONCE
Status: COMPLETED | OUTPATIENT
Start: 2025-06-26 | End: 2025-06-26

## 2025-06-26 RX ORDER — HYDROMORPHONE HYDROCHLORIDE 1 MG/ML
0.1 INJECTION, SOLUTION INTRAMUSCULAR; INTRAVENOUS; SUBCUTANEOUS
Status: DISCONTINUED | OUTPATIENT
Start: 2025-06-26 | End: 2025-06-26 | Stop reason: HOSPADM

## 2025-06-26 RX ORDER — HALOPERIDOL 5 MG/ML
1 INJECTION INTRAMUSCULAR
Status: DISCONTINUED | OUTPATIENT
Start: 2025-06-26 | End: 2025-06-26 | Stop reason: HOSPADM

## 2025-06-26 RX ORDER — ROCURONIUM BROMIDE 10 MG/ML
INJECTION, SOLUTION INTRAVENOUS PRN
Status: DISCONTINUED | OUTPATIENT
Start: 2025-06-26 | End: 2025-06-26 | Stop reason: SURG

## 2025-06-26 RX ORDER — SCOPOLAMINE 1 MG/3D
1 PATCH, EXTENDED RELEASE TRANSDERMAL
Status: DISCONTINUED | OUTPATIENT
Start: 2025-06-26 | End: 2025-06-26 | Stop reason: HOSPADM

## 2025-06-26 RX ORDER — HYDROMORPHONE HYDROCHLORIDE 1 MG/ML
0.4 INJECTION, SOLUTION INTRAMUSCULAR; INTRAVENOUS; SUBCUTANEOUS
Status: DISCONTINUED | OUTPATIENT
Start: 2025-06-26 | End: 2025-06-26 | Stop reason: HOSPADM

## 2025-06-26 RX ORDER — BUPIVACAINE HYDROCHLORIDE 2.5 MG/ML
INJECTION, SOLUTION EPIDURAL; INFILTRATION; INTRACAUDAL; PERINEURAL
Status: DISCONTINUED | OUTPATIENT
Start: 2025-06-26 | End: 2025-06-26 | Stop reason: HOSPADM

## 2025-06-26 RX ORDER — SODIUM CHLORIDE, SODIUM LACTATE, POTASSIUM CHLORIDE, CALCIUM CHLORIDE 600; 310; 30; 20 MG/100ML; MG/100ML; MG/100ML; MG/100ML
INJECTION, SOLUTION INTRAVENOUS CONTINUOUS
Status: DISCONTINUED | OUTPATIENT
Start: 2025-06-26 | End: 2025-06-26 | Stop reason: HOSPADM

## 2025-06-26 RX ORDER — HYDROMORPHONE HYDROCHLORIDE 1 MG/ML
0.2 INJECTION, SOLUTION INTRAMUSCULAR; INTRAVENOUS; SUBCUTANEOUS
Status: DISCONTINUED | OUTPATIENT
Start: 2025-06-26 | End: 2025-06-26 | Stop reason: HOSPADM

## 2025-06-26 RX ORDER — OXYCODONE HCL 10 MG/1
10 TABLET, FILM COATED, EXTENDED RELEASE ORAL ONCE
Status: COMPLETED | OUTPATIENT
Start: 2025-06-26 | End: 2025-06-26

## 2025-06-26 RX ORDER — HYDRALAZINE HYDROCHLORIDE 20 MG/ML
5 INJECTION INTRAMUSCULAR; INTRAVENOUS
Status: DISCONTINUED | OUTPATIENT
Start: 2025-06-26 | End: 2025-06-26 | Stop reason: HOSPADM

## 2025-06-26 RX ORDER — BUPIVACAINE HYDROCHLORIDE 2.5 MG/ML
INJECTION, SOLUTION EPIDURAL; INFILTRATION; INTRACAUDAL; PERINEURAL
Status: COMPLETED
Start: 2025-06-26 | End: 2025-06-26

## 2025-06-26 RX ORDER — ACETAMINOPHEN 500 MG
1000 TABLET ORAL ONCE
Status: COMPLETED | OUTPATIENT
Start: 2025-06-26 | End: 2025-06-26

## 2025-06-26 RX ORDER — SODIUM CHLORIDE, SODIUM LACTATE, POTASSIUM CHLORIDE, CALCIUM CHLORIDE 600; 310; 30; 20 MG/100ML; MG/100ML; MG/100ML; MG/100ML
INJECTION, SOLUTION INTRAVENOUS
Status: DISCONTINUED | OUTPATIENT
Start: 2025-06-26 | End: 2025-06-26 | Stop reason: HOSPADM

## 2025-06-26 RX ORDER — OXYCODONE HYDROCHLORIDE 5 MG/1
5 TABLET ORAL EVERY 6 HOURS PRN
Qty: 7 TABLET | Refills: 0 | Status: SHIPPED | OUTPATIENT
Start: 2025-06-26 | End: 2025-06-26 | Stop reason: SDUPTHER

## 2025-06-26 RX ORDER — ONDANSETRON 4 MG/1
4 TABLET, ORALLY DISINTEGRATING ORAL EVERY 4 HOURS PRN
Qty: 10 TABLET | Refills: 0 | Status: SHIPPED | OUTPATIENT
Start: 2025-06-26

## 2025-06-26 RX ORDER — HEPARIN SODIUM 5000 [USP'U]/ML
5000 INJECTION, SOLUTION INTRAVENOUS; SUBCUTANEOUS ONCE
Status: COMPLETED | OUTPATIENT
Start: 2025-06-26 | End: 2025-06-26

## 2025-06-26 RX ORDER — MIDAZOLAM HYDROCHLORIDE 1 MG/ML
INJECTION INTRAMUSCULAR; INTRAVENOUS PRN
Status: DISCONTINUED | OUTPATIENT
Start: 2025-06-26 | End: 2025-06-26 | Stop reason: SURG

## 2025-06-26 RX ORDER — ONDANSETRON 2 MG/ML
4 INJECTION INTRAMUSCULAR; INTRAVENOUS
Status: COMPLETED | OUTPATIENT
Start: 2025-06-26 | End: 2025-06-26

## 2025-06-26 RX ORDER — METRONIDAZOLE 500 MG/100ML
500 INJECTION, SOLUTION INTRAVENOUS ONCE
Status: DISCONTINUED | OUTPATIENT
Start: 2025-06-26 | End: 2025-06-26 | Stop reason: HOSPADM

## 2025-06-26 RX ORDER — ONDANSETRON 2 MG/ML
4 INJECTION INTRAMUSCULAR; INTRAVENOUS EVERY 4 HOURS PRN
Status: DISCONTINUED | OUTPATIENT
Start: 2025-06-26 | End: 2025-06-26 | Stop reason: HOSPADM

## 2025-06-26 RX ORDER — EPHEDRINE SULFATE 50 MG/ML
INJECTION, SOLUTION INTRAVENOUS PRN
Status: DISCONTINUED | OUTPATIENT
Start: 2025-06-26 | End: 2025-06-26 | Stop reason: SURG

## 2025-06-26 RX ORDER — MEPERIDINE HYDROCHLORIDE 25 MG/ML
6.25 INJECTION INTRAMUSCULAR; INTRAVENOUS; SUBCUTANEOUS
Status: DISCONTINUED | OUTPATIENT
Start: 2025-06-26 | End: 2025-06-26 | Stop reason: HOSPADM

## 2025-06-26 RX ORDER — CEFAZOLIN SODIUM 1 G/3ML
INJECTION, POWDER, FOR SOLUTION INTRAMUSCULAR; INTRAVENOUS PRN
Status: DISCONTINUED | OUTPATIENT
Start: 2025-06-26 | End: 2025-06-26 | Stop reason: SURG

## 2025-06-26 RX ORDER — KETOROLAC TROMETHAMINE 15 MG/ML
INJECTION, SOLUTION INTRAMUSCULAR; INTRAVENOUS PRN
Status: DISCONTINUED | OUTPATIENT
Start: 2025-06-26 | End: 2025-06-26 | Stop reason: SURG

## 2025-06-26 RX ORDER — IBUPROFEN 800 MG/1
800 TABLET, FILM COATED ORAL EVERY 6 HOURS PRN
Status: DISCONTINUED | OUTPATIENT
Start: 2025-06-26 | End: 2025-06-26 | Stop reason: HOSPADM

## 2025-06-26 RX ORDER — ONDANSETRON 2 MG/ML
INJECTION INTRAMUSCULAR; INTRAVENOUS PRN
Status: DISCONTINUED | OUTPATIENT
Start: 2025-06-26 | End: 2025-06-26 | Stop reason: HOSPADM

## 2025-06-26 RX ORDER — OXYCODONE HYDROCHLORIDE 5 MG/1
TABLET ORAL
Status: COMPLETED
Start: 2025-06-26 | End: 2025-06-26

## 2025-06-26 RX ORDER — ACETAMINOPHEN 500 MG
1000 TABLET ORAL ONCE
Status: DISCONTINUED | OUTPATIENT
Start: 2025-06-26 | End: 2025-06-26

## 2025-06-26 RX ORDER — ONDANSETRON 4 MG/1
4 TABLET, ORALLY DISINTEGRATING ORAL EVERY 4 HOURS PRN
Status: DISCONTINUED | OUTPATIENT
Start: 2025-06-26 | End: 2025-06-26 | Stop reason: HOSPADM

## 2025-06-26 RX ORDER — GABAPENTIN 300 MG/1
300 CAPSULE ORAL ONCE
Status: DISCONTINUED | OUTPATIENT
Start: 2025-06-26 | End: 2025-06-26 | Stop reason: HOSPADM

## 2025-06-26 RX ORDER — PHENAZOPYRIDINE HYDROCHLORIDE 200 MG/1
200 TABLET, FILM COATED ORAL ONCE
Status: COMPLETED | OUTPATIENT
Start: 2025-06-26 | End: 2025-06-26

## 2025-06-26 RX ORDER — OXYCODONE HCL 5 MG/5 ML
5 SOLUTION, ORAL ORAL
Status: COMPLETED | OUTPATIENT
Start: 2025-06-26 | End: 2025-06-26

## 2025-06-26 RX ADMIN — SCOPOLAMINE 1 PATCH: 1.5 PATCH, EXTENDED RELEASE TRANSDERMAL at 06:53

## 2025-06-26 RX ADMIN — KETOROLAC TROMETHAMINE 15 MG: 15 INJECTION, SOLUTION INTRAMUSCULAR; INTRAVENOUS at 09:25

## 2025-06-26 RX ADMIN — FENTANYL CITRATE 25 MCG: 50 INJECTION, SOLUTION INTRAMUSCULAR; INTRAVENOUS at 09:40

## 2025-06-26 RX ADMIN — CEFAZOLIN 2 G: 1 INJECTION, POWDER, FOR SOLUTION INTRAMUSCULAR; INTRAVENOUS at 07:37

## 2025-06-26 RX ADMIN — FENTANYL CITRATE 50 MCG: 50 INJECTION, SOLUTION INTRAMUSCULAR; INTRAVENOUS at 08:04

## 2025-06-26 RX ADMIN — MIDAZOLAM HYDROCHLORIDE 2 MG: 1 INJECTION, SOLUTION INTRAMUSCULAR; INTRAVENOUS at 07:28

## 2025-06-26 RX ADMIN — FENTANYL CITRATE 50 MCG: 50 INJECTION, SOLUTION INTRAMUSCULAR; INTRAVENOUS at 10:05

## 2025-06-26 RX ADMIN — FENTANYL CITRATE 50 MCG: 50 INJECTION, SOLUTION INTRAMUSCULAR; INTRAVENOUS at 07:37

## 2025-06-26 RX ADMIN — ONDANSETRON 4 MG: 2 INJECTION INTRAMUSCULAR; INTRAVENOUS at 09:25

## 2025-06-26 RX ADMIN — HYDROMORPHONE HYDROCHLORIDE 0.4 MG: 1 INJECTION, SOLUTION INTRAMUSCULAR; INTRAVENOUS; SUBCUTANEOUS at 10:58

## 2025-06-26 RX ADMIN — SUGAMMADEX 200 MG: 100 INJECTION, SOLUTION INTRAVENOUS at 09:25

## 2025-06-26 RX ADMIN — METRONIDAZOLE 500 MG: 5 INJECTION, SOLUTION INTRAVENOUS at 07:50

## 2025-06-26 RX ADMIN — FENTANYL CITRATE 50 MCG: 50 INJECTION, SOLUTION INTRAMUSCULAR; INTRAVENOUS at 08:37

## 2025-06-26 RX ADMIN — PROPOFOL 150 MG: 10 INJECTION, EMULSION INTRAVENOUS at 07:37

## 2025-06-26 RX ADMIN — HYDROMORPHONE HYDROCHLORIDE 0.4 MG: 1 INJECTION, SOLUTION INTRAMUSCULAR; INTRAVENOUS; SUBCUTANEOUS at 10:35

## 2025-06-26 RX ADMIN — DEXAMETHASONE SODIUM PHOSPHATE 8 MG: 4 INJECTION INTRA-ARTICULAR; INTRALESIONAL; INTRAMUSCULAR; INTRAVENOUS; SOFT TISSUE at 07:40

## 2025-06-26 RX ADMIN — PHENAZOPYRIDINE 200 MG: 200 TABLET ORAL at 06:52

## 2025-06-26 RX ADMIN — ROCURONIUM BROMIDE 10 MG: 10 INJECTION INTRAVENOUS at 08:40

## 2025-06-26 RX ADMIN — OXYCODONE HYDROCHLORIDE 10 MG: 10 TABLET, FILM COATED, EXTENDED RELEASE ORAL at 06:52

## 2025-06-26 RX ADMIN — SODIUM CHLORIDE, POTASSIUM CHLORIDE, SODIUM LACTATE AND CALCIUM CHLORIDE: 600; 310; 30; 20 INJECTION, SOLUTION INTRAVENOUS at 07:30

## 2025-06-26 RX ADMIN — LIDOCAINE HYDROCHLORIDE 80 MG: 20 INJECTION, SOLUTION EPIDURAL; INFILTRATION; INTRACAUDAL; PERINEURAL at 07:37

## 2025-06-26 RX ADMIN — ONDANSETRON 4 MG: 2 INJECTION INTRAMUSCULAR; INTRAVENOUS at 19:06

## 2025-06-26 RX ADMIN — FENTANYL CITRATE 50 MCG: 50 INJECTION, SOLUTION INTRAMUSCULAR; INTRAVENOUS at 10:13

## 2025-06-26 RX ADMIN — CELECOXIB 200 MG: 200 CAPSULE ORAL at 06:53

## 2025-06-26 RX ADMIN — ACETAMINOPHEN 1000 MG: 500 TABLET ORAL at 19:54

## 2025-06-26 RX ADMIN — FENTANYL CITRATE 25 MCG: 50 INJECTION, SOLUTION INTRAMUSCULAR; INTRAVENOUS at 09:30

## 2025-06-26 RX ADMIN — OXYCODONE HYDROCHLORIDE 5 MG: 5 SOLUTION ORAL at 19:58

## 2025-06-26 RX ADMIN — EPHEDRINE SULFATE 15 MG: 50 INJECTION, SOLUTION INTRAVENOUS at 08:40

## 2025-06-26 RX ADMIN — HEPARIN SODIUM 5000 UNITS: 5000 INJECTION, SOLUTION INTRAVENOUS; SUBCUTANEOUS at 06:55

## 2025-06-26 RX ADMIN — OXYCODONE HYDROCHLORIDE 10 MG: 5 SOLUTION ORAL at 10:12

## 2025-06-26 RX ADMIN — HYDROMORPHONE HYDROCHLORIDE 0.4 MG: 1 INJECTION, SOLUTION INTRAMUSCULAR; INTRAVENOUS; SUBCUTANEOUS at 10:20

## 2025-06-26 RX ADMIN — ONDANSETRON 4 MG: 2 INJECTION INTRAMUSCULAR; INTRAVENOUS at 12:10

## 2025-06-26 RX ADMIN — PROPOFOL 30 MG: 10 INJECTION, EMULSION INTRAVENOUS at 09:41

## 2025-06-26 RX ADMIN — ROCURONIUM BROMIDE 40 MG: 10 INJECTION INTRAVENOUS at 07:37

## 2025-06-26 RX ADMIN — OXYCODONE HYDROCHLORIDE 5 MG: 5 TABLET ORAL at 15:56

## 2025-06-26 RX ADMIN — ACETAMINOPHEN 1000 MG: 500 TABLET ORAL at 06:50

## 2025-06-26 RX ADMIN — IBUPROFEN 800 MG: 800 TABLET, FILM COATED ORAL at 16:35

## 2025-06-26 ASSESSMENT — PAIN DESCRIPTION - PAIN TYPE
TYPE: SURGICAL PAIN

## 2025-06-26 ASSESSMENT — PATIENT HEALTH QUESTIONNAIRE - PHQ9
2. FEELING DOWN, DEPRESSED, IRRITABLE, OR HOPELESS: NOT AT ALL
1. LITTLE INTEREST OR PLEASURE IN DOING THINGS: NOT AT ALL
SUM OF ALL RESPONSES TO PHQ9 QUESTIONS 1 AND 2: 0

## 2025-06-26 ASSESSMENT — FIBROSIS 4 INDEX
FIB4 SCORE: 0.67
FIB4 SCORE: 0.67

## 2025-06-26 ASSESSMENT — PAIN SCALES - GENERAL: PAIN_LEVEL: 5

## 2025-06-26 NOTE — OR NURSING
0951- pt arrives from OR to PACU 2, report received from RN and anesthesia. Pt place on monitor. VSS,  NAD noted. Oral airway in place.   O2 6L via mask.    Lap sites x 5 open to air, dermabond noted.   Peripad in place- CDI    1000-OPA d/c'd    1015-medicated per MAR for pain- tolerates sips of water    1030-medicated for continued pain    1035- called and updated on status/ POC    1053-hand off to THEODORA Pollard    1137-report called to tabitha Cartagena II RN  Transported over, all belongings accounted for

## 2025-06-26 NOTE — ANESTHESIA PROCEDURE NOTES
Airway    Date/Time: 6/26/2025 7:39 AM    Performed by: Dandy Lopez D.O.  Authorized by: Dandy Lopez D.O.    Location:  OR  Urgency:  Elective  Difficult Airway: No    Indications for Airway Management:  Anesthesia      Spontaneous Ventilation: absent    Sedation Level:  Deep  Preoxygenated: Yes    Patient Position:  Sniffing  Mask Difficulty Assessment:  2 - vent by mask + OA or adjuvant +/- NMBA  Final Airway Type:  Endotracheal airway  Final Endotracheal Airway:  ETT  Cuffed: Yes    Technique Used for Successful ETT Placement:  Direct laryngoscopy    Insertion Site:  Oral  Blade Type:  Blair  Laryngoscope Blade/Videolaryngoscope Blade Size:  3  ETT Size (mm):  7.0  Measured from:  Teeth  ETT to Teeth (cm):  20  Placement Verified by: auscultation and capnometry    Cormack-Lehane Classification:  Grade I - full view of glottis  Number of Attempts at Approach:  1

## 2025-06-26 NOTE — OR NURSING
1137- Pt to phase 2, assisted to recliner, VSS on RA. Pain is tolerable at a 4/10. Pt's family brought back to bedside, pt is DTV.     1200- Pt's  at bedside, discharge instructions reviewed with pt and pt's , all instructions acknowledged, all questions answered, pt assisted up to the bathroom. 5 lap sites CDI, dermabond in place.     1207- Pt unable to void, given zofran for nausea per MAR, bladder scan showed 155cc. IVF hooked up, giving pt more time.     1300- Pt assisted up to bathroom,  assisting.     1350 back to recliner, updated Dr. Almonte that patient unable to void, per Dr. Almonte, give pt until 3:30pm to void.     1517- Pt unable to void, Dr. Almonte messaged. Per Dr. Almonte she wants pt straight cathed and then given 3 more hours to void. Asked Dr. Almonte if pt can be sent to the floor for extended recovery. Pt straight cathed and 300cc output, orders placed for extended recovery.     1551- Report given to THEODORA Truong.     1556- Pt given pain medicine per MAR    1610- Pt transferred to room in [facility] on RA, pt transferred by RN, all belongings sent with pt.

## 2025-06-26 NOTE — ANESTHESIA POSTPROCEDURE EVALUATION
Patient: Peggy Briones Vicente    Procedure Summary       Date: 06/26/25 Room / Location: [facility] ROOM 21 / SURGERY SAME DAY [facility]    Anesthesia Start: 0730 Anesthesia Stop: 0953    Procedures:       TOTAL LAPAROSCOPIC HYSTERECTOMY, BILATERAL SALPINGECTOMY, RIGHT OVARIAN CYSTECTOMY, CYSTOSCOPY (Abdomen)      EXCISION, CYST, OVARY (Right: Abdomen) Diagnosis: (ABNORMAL UTERINE BLEEDING)    Surgeons: Brigid Almonte M.D. Responsible Provider: Dandy Lopez D.O.    Anesthesia Type: general ASA Status: 2            Final Anesthesia Type: general  Last vitals  BP   Blood Pressure: 117/64    Temp   37.1 °C (98.8 °F)    Pulse   79   Resp   18    SpO2   99 %      Anesthesia Post Evaluation    Patient location during evaluation: PACU  Patient participation: complete - patient participated  Level of consciousness: awake and alert  Pain score: 5    Airway patency: patent  Anesthetic complications: no  Cardiovascular status: hemodynamically stable  Respiratory status: acceptable  Hydration status: euvolemic    PONV: none          No notable events documented.     Nurse Pain Score: 0 (NPRS)

## 2025-06-26 NOTE — OP REPORT
OPERATIVE REPORT    Name: Peggy Vicente   : 1985   MRN: [id number]      PRE-OP DIAGNOSIS:   Abnormal uterine bleeding           POST-OP DIAGNOSIS:   Abnormal uterine bleeding  Right ovarian cyst, likely corpus luteum           PROCEDURE:   Procedure(s):  TOTAL LAPAROSCOPIC HYSTERECTOMY, BILATERAL SALPINGECTOMY, RIGHT OVARIAN CYSTECTOMY, CYSTOSCOPY    SURGEON:   Brigid Almonte MD - Primary  STEFANY Ledezma - Assist            ANESTHESIA: General           FINDINGS:   Laparoscopy  - Systematic review of the abdomen and pelvis noted no evidence of endometriosis   - Globular appearing uterus, suspicious for adenomyosis  - Fallopian tubes status post ligation  - Right ovary with small cyst suspicious for corpus luteum, excised as patient has been experiencing pain on  right lower quadrant  - Left ovary normal appearing     Cystoscopy  - Normal bladder without lesions/masses, no evidence of perforating suture  - Brisk bilateral ureteral jets    ESTIMATED BLOOD LOSS: 50 ml           DRAINS:None                  SPECIMENS:   ID Type Source Tests Collected by Time Destination   A : uterus, cervix, bilateral fallopian tubes Other Other PATHOLOGY SPECIMEN Brigid Almonte M.D. 2025  8:15 AM    B : right ovarian cyst Other Other PATHOLOGY SPECIMEN Brigid Almonte M.D. 2025  8:55 AM               IMPLANTS: * No implants in log *           COMPLICATIONS: None           DISPOSITION: Discharge           CONDITION: Stable           INDICATION FOR SURGERY:   40 y.o.  with abnormal uterine bleeding and right ovarian cyst who presents for total laparoscopic hysterectomy, bilateral salpingectomy, cystoscopy,  right ovarian cystectomy. Patient understood all risks including but not limited to infection, bleeding, damage to bowel, bladder, ureters, vessels, nerves, conversion to laparotomy, and blood transfusion. She had discussed all alternatives and decision was made to proceed with the above procedure.  She signed informed consent.       TECHNIQUE:   The patient was taken to the operating room where general anesthesia was obtained without difficulty. The patient was placed in dorsal lithotomy position with feet in yellow fin stirrups. She was prepped and draped in the usual sterile fashion with arms tucked at the side.    A time-out was called before the start of the procedure. Prince catheter was placed. A speculum was placed in the vagina. The cervix was grasped with single-tooth tenaculum anteriorly and a uterine manipulator was placed inside the uterus.     Attention was shifted to the patient's abdomen. 0.25% marcaine was injected at the left upper quadrant. A small incision was made and the Veress needle was inserted with correct intraperitoneal placement confirmed with a positive saline drop test and low opening pressure. The abdomen was then insufflated until dullness to percussion was no longer heard over the liver's edge. A 5 mm incision was made in the left upper quadrant and the trocar was inserted through this incision under direct visualization with a laparoscope. No bowel injury or vascular injury were noted at the point of entry. The patient was then placed into Trendelenburg. Initial survey of the abdomen revealed the above findings.     A 5mm port was placed on the umbilicus. Two additional 5mm ports were  then placed in the right lower quadrant with careful attention to avoid the inferior Epigastric vessels. An additional 5mm port was then placed in the left lower quadrant with careful attention to avoid the inferior Epigastric vessels.      Attention was then turned to the pelvis. Bilateral salpingectomy was then performed using the bipolar device. Both uteroovarian ligaments and round ligaments were serially coapted and cut with the bipolar device.  The anterior and posterior leaves of the broad ligament were then incised inferiorly and the bladder flap was dissected down and completed  bilaterally. The uterine arteries were skeletonized bilaterally and serially coapted and cut with excellent hemostasis obtained. Colpotomy was then made along the colpotomy ring with monopolar hook while the bladder adequately deflected downwards. The colpotomy was continued circumferentially until the uterus and cervix were amputated. The specimen was retrieved vaginally.    Attention was turned to the right ovary.  There was a suspected corpus luteum.  As patient has been experiencing pain on the right side, I decided to explore the cyst. The ovarian capsule was transected with monopolar energy.   The cyst wall was excised and sent to pathology.  Hemostasis was achieved with monopolar energy.    The vaginal cuff was then closed using 0 V-lock in 2 layers. All pedicles were inspected and hemostasis was confirmed under low pressure. Surgicel powder was applied over the pedicles. Bilateral peristalsing ureters were visualized and were far below the level of dissection.     Cystoscopy was performed with a 70-degree scope. Normal bilateral ureteral jets were seen. Bladder integrity was inspected and intact.     Prince was removed and all instruments were removed from the vagina.    The abdomen was desufflated and all laparoscopic ports were removed.  The incisions were closed with 4-0 Monocryl and topped with Dermabond.    Sponge, lap, and needle counts were correct x 2. Patient tolerated the procedure well. She was then extubated without difficulty and transferred to the recovery room in stable condition.         Brigid Almonte MD  Minimally Invasive Gynecologic Surgery

## 2025-06-26 NOTE — ANESTHESIA TIME REPORT
Anesthesia Start and Stop Event Times       Date Time Event    6/26/2025 0707 Ready for Procedure     0730 Anesthesia Start     0953 Anesthesia Stop          Responsible Staff  06/26/25      Name Role Begin End    Dandy Lopez D.O. Anesth 0730 0953          Overtime Reason:  no overtime (within assigned shift)    Comments:

## 2025-06-26 NOTE — OR NURSING
1053 Report from Cheryl YIP.   1058 Pt complaints of 6/10 pain, medicated with IV dilaudid per order.  Pt repositioned for comfort.  Denies nausea, tolerating PO well.  1115 Hand off to Cheryl YIP.

## 2025-06-26 NOTE — PROGRESS NOTES
Report received from PACU RN. Assumed care at 1635  Patient a & o x 4.   SpO2 98 on RA  Post procedure void still needed.  Abd soft,x5 lap sites open to air, No bleeding or hepatomas noted, heat pack in place. Bowel sounds +, denies nausea  Pt tolerating crackers and liquids   Pt states pain 5/10 to surgical site, Medicated per MAR  Patient ambulating with no assistance . Gait steady.   Patient oriented to room, surroundings and call bell. Bed alarm not indicated. Bed in lowest position, locked and upper side rails up. Patient demonstrated correct use of call bell. Call bell/belongings within reach. Patient educated on hourly rounding.   Plan for today:  Post op void  discharge

## 2025-06-26 NOTE — ANESTHESIA PREPROCEDURE EVALUATION
Case: [id number] Date/Time: 06/26/25 0715    Procedures:       TOTAL LAPAROSCOPIC HYSTERECTOMY, BILATERAL SALPINGECTOMY, POSSIBLE RIGHT OVARIAN CYSTECTOMY, CYSTOSCOPY      EXCISION, CYST, OVARY    Pre-op diagnosis: ABNORMAL UTERINE BLEEDING    Location: [facility] ROOM 21 / SURGERY SAME DAY [facility]    Surgeons: Brigid Almonte M.D.            Relevant Problems   CARDIAC   (positive) PVC's (premature ventricular contractions)       Physical Exam    Airway   Mallampati: II  TM distance: >3 FB  Neck ROM: full       Cardiovascular - normal exam  Rhythm: regular  Rate: normal    (-) murmur     Dental - normal exam           Pulmonary - normal examBreath sounds clear to auscultation     Abdominal    Neurological - normal exam                   Anesthesia Plan    ASA 2       Plan - general       Airway plan will be ETT          Induction: intravenous    Postoperative Plan: Postoperative administration of opioids is intended.    Pertinent diagnostic labs and testing reviewed    Informed Consent:    Anesthetic plan and risks discussed with patient.    Use of blood products discussed with: patient whom consented to blood products.

## 2025-06-27 ENCOUNTER — TELEPHONE (OUTPATIENT)
Dept: OBGYN | Facility: CLINIC | Age: 40
End: 2025-06-27
Payer: COMMERCIAL

## 2025-06-27 DIAGNOSIS — G89.18 POSTOPERATIVE PAIN: ICD-10-CM

## 2025-06-27 PROCEDURE — 99999 PR NO CHARGE: CPT | Performed by: OBSTETRICS & GYNECOLOGY

## 2025-06-27 RX ORDER — OXYCODONE HYDROCHLORIDE 5 MG/1
5 TABLET ORAL EVERY 6 HOURS PRN
Qty: 8 TABLET | Refills: 0 | Status: SHIPPED | OUTPATIENT
Start: 2025-06-27 | End: 2025-07-01

## 2025-06-27 NOTE — TELEPHONE ENCOUNTER
Kenneth from [facility] called to get clarification on an rx that was sent today by  (Oxycodone). He needed to clarify how many days of a supply this would be for which I relayed would be a 2 day supply and read him the sig. He stated he would put it through to be filled. Gave him my name and call was ended.

## 2025-06-27 NOTE — PROGRESS NOTES
Meds to bed delivered and provided to patient. Discharge instructions provided and reviewed. PIV removed. Patient wheeled down with assistance of charge RN. All belongings sent home with patient.

## 2025-06-27 NOTE — PROGRESS NOTES
Pt voided in restroom. PRV obtain MD informed. MD rec placing san catheter and DC home, follow up for voiding trial output 6/27. Discussed with pt. Agreeable to treatment plan

## 2025-06-27 NOTE — PROGRESS NOTES
Called patient and confirmed her  and identity. She is POD #1 s/p total laparoscopic hysterectomy with BTL. She states she is feeling better than she was yesterday. She states she was very sore yesterday after surgery.     She talked with Dr. ayala this morning about an additional prescription for oxycodone as she is nervous she is going to run out this weekend. Prescription was sent but there was an issue with the day supply. I will contact Dr. Ayala in regards to this so we can get it figured out before the weekend.       Leonor Rodrigez PA-C

## 2025-06-27 NOTE — PROGRESS NOTES
I called patient to check on her postop recovery.  Had a rough day yesterday with urinary retention and pain.  Today feeling slightly better.  Taking Tylenol and ibuprofen and oxycodone.  No vaginal bleeding.  Not passing gas yet.  Voiding without issues.  Decreased appetite.  She is concerned she is going to run out of oxycodone over the weekend.  Reviewed postop expectations, medication schedule.  Prescription for oxycodone sent.  She should notify us if her symptoms worsen or do not improve.

## 2025-07-01 ENCOUNTER — RESULTS FOLLOW-UP (OUTPATIENT)
Dept: GYNECOLOGY | Facility: CLINIC | Age: 40
End: 2025-07-01
Payer: COMMERCIAL

## 2025-07-01 NOTE — PROGRESS NOTES
Notified by PACU nurse the patient needed to be transferred to extended recovery due to urinary retention.  Straight cath was performed.  Later on patient was able to void spontaneously and passed voiding trial.  She was discharged home without complications.

## 2025-07-11 ENCOUNTER — GYNECOLOGY VISIT (OUTPATIENT)
Dept: GYNECOLOGY | Facility: CLINIC | Age: 40
End: 2025-07-11
Payer: COMMERCIAL

## 2025-07-11 VITALS
DIASTOLIC BLOOD PRESSURE: 79 MMHG | BODY MASS INDEX: 25.4 KG/M2 | HEART RATE: 77 BPM | WEIGHT: 148 LBS | SYSTOLIC BLOOD PRESSURE: 123 MMHG

## 2025-07-11 DIAGNOSIS — Z48.89 POSTOPERATIVE VISIT: Primary | ICD-10-CM

## 2025-07-11 PROCEDURE — 3074F SYST BP LT 130 MM HG: CPT | Performed by: STUDENT IN AN ORGANIZED HEALTH CARE EDUCATION/TRAINING PROGRAM

## 2025-07-11 PROCEDURE — 3078F DIAST BP <80 MM HG: CPT | Performed by: STUDENT IN AN ORGANIZED HEALTH CARE EDUCATION/TRAINING PROGRAM

## 2025-07-11 PROCEDURE — 99024 POSTOP FOLLOW-UP VISIT: CPT | Performed by: STUDENT IN AN ORGANIZED HEALTH CARE EDUCATION/TRAINING PROGRAM

## 2025-07-11 ASSESSMENT — FIBROSIS 4 INDEX: FIB4 SCORE: 0.67

## 2025-07-11 NOTE — PROGRESS NOTES
Postoperative Visit - Minimally Invasive Gynecologic Surgery     HPI: 40 y.o. female who presents today for a post-operative visit.  Reports constipation, MiraLAX helps.  Otherwise no complaints.    Surgery performed: TOTAL LAPAROSCOPIC HYSTERECTOMY, BILATERAL SALPINGECTOMY, RIGHT OVARIAN CYSTECTOMY, CYSTOSCOPY  on 6/26  Complications: none    Pathology report:    Pathology Results (Last result in 90 days)                06/26/25 0815  Narrative:                                                                                                                                                                               Pathology Specimen  [facility]                          DEPARTMENT OF PATHOLOGY                   [address] [place] [address]  [id number]              Phone [phone]          Fax [phone]   Isa Salazar M.D.         Geovanna Judd M.D.     CONSTANTINE Roach D.O.      VASU New M.D. Brandon R. Peterson, M.D.    Patient:    DARLENE HERNÁNDEZ     Specimen    [id number]                                           #:      Copies to:                        SURGICAL PATHOLOGY CONSULTATION      FINAL DIAGNOSIS:    A. 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         No invasive tumor identified on thorough examination of cervix.         Surgical margins free of in-situ tumor.         Secretory type endometrium.         Myometrium with adenomyosis and leiomyoma.         Bilateral fimbriated fallopian tubes, the left with benign          paratubal cysts.  B. Right ovarian cyst:         Benign hemorrhagic corpus luteum cyst.    COMMENT:  Dr. Choi has also seen slide A1 and concurs that endocervical  adenocarcinoma in-situ is present.                                        Diagnosis performed by:             "                          LEONARDA RAE MD  ------------------------------------------------------------------------  ---------------------------------------------------------------  CODES: [id number]  2007x1  [id number]  [id number]    SPECIMEN(S)  A. Uterus, cervix, bilateral fallopian tubes:  B. Right ovarian cyst:    PREOPERATIVE DIAGNOSIS:  Abnormal uterine bleeding (A: Left fallopian tube is marked/colored)    POSTOPERATIVE DIAGNOSIS:  Abnormal uterine bleeding       GROSS DESCRIPTION:  A. Received in formalin labeled with the patient's name, medical record  number, and \"uterus, cervix, bilateral fallopian tubes\" is a 141.8 g,  10 x 6.3 x 4 cm uterus with detached bilateral fallopian tubes.  The  serosa is pale-pink and smooth.  The cervix is 3.5 cm in length, and  3.2 cm of smooth cervical mucosa is present.  The endometrium is  slightly erythematous, without lesions, measuring up to 0.5 cm thick.  The myometrium is tan-pink to yellow and coarsely trabeculated, and a 1  cm well-circumscribed nodules identified, having gray-white, whorled,  and bulging cut surfaces (without calcification/necrosis).  The  fallopian tubes are fimbriated, measuring 3.9-4.3 cm in length  (right-left respectively). RS7 /[id number] [facility]    SLIDE KEY:  A1-A2: Anterior-posterior cervix respectively  A3-A4: Anterior-posterior endomyometrium respectively  A5:  Intramural nodule  A6-A7: Left-right fallopian tube respectively  ARG1-11: Remainder of cervix    B. Received in formalin labeled with the patient's name, medical record  number, and \"right ovarian cyst\" are 7 pink-red membranous/rubbery  tissue fragments measuring up to 1.7 cm in maximum dimensions. TS 2  /[id number] [facility]    MICROSCOPIC DESCRIPTION:  A, B) Microscopic examination was performed.  Please see diagnosis.    Report electronically signed by:  LEONARDA RAE MD ,  Diagnosis performed at: [facility]  Pathology  Department, [address] [place] [address], [place]  [address]      Printed " [birth year]0000 [id number] DARLENE HERNÁNDEZ   Page 1 of 1 MRN#:[id number]                    Physical exam:  Vitals:    07/11/25 1121   BP: 123/79   Pulse: 77     General: NAD  Abdominal exam: The abdomen is soft and non-tender without guarding or rebound. The incisions appear to be healing well without erythema, edema, or infection.       Assessment: 40 y.o. female here for postop visit    Plan:  - Doing well, meeting milestones  - Return for vaginal exam  - Path with AIS, negative margins. Per [facility], recommendation for vaginal fornix testing for high-risk  (HPV) for three years consecutively after hysterectomy.    Brigid Almonte MD

## 2025-07-11 NOTE — PROGRESS NOTES
Pt is here today for her post op visit. She states she still is feeling some sharp right abdominal pain and is having problems with her bowel movement. She is also having gas pain.

## 2025-07-15 ENCOUNTER — RESEARCH ENCOUNTER (OUTPATIENT)
Dept: SLEEP MEDICINE | Facility: MEDICAL CENTER | Age: 40
End: 2025-07-15
Payer: COMMERCIAL

## 2025-07-16 ENCOUNTER — RESEARCH ENCOUNTER (OUTPATIENT)
Dept: SLEEP MEDICINE | Facility: MEDICAL CENTER | Age: 40
End: 2025-07-16
Payer: COMMERCIAL

## 2025-07-16 NOTE — RESEARCH NOTE
Title: Assessing women's quality of life, health outcomes, and cost-effectiveness of utilizing robotic surgery techniques for gynecological [place].    Womens Health Gynecological - Survey on the robotic assisted surgery using the da Ferm[employer] Surgical System compared to other gynecological surgery methods     Participation in the Women's Health Gynecological Study was discussed today with Peggy over the phone. All aspects of the study purpose and procedures were explained. Patient was given ample time to ask questions in regards to the consent and all questions were answered to her satisfaction. Patient is aware that the clinical trial is voluntary and she may withdraw consent at any time without affecting the level of care they receive. The HIPAA authorization for research was reviewed verbally. The patient gave verbal consent to participate without coercion and undue influence. No study-related procedures took place prior to consenting and all assessments were conducted per protocol.     Informed Consent Questions:    Informed consent was discussed with subject.  Verbal consent provided Yes  2. The subject was given adequate time to ask questions regarding the verbal consent with site staff and/or family members.Yes  3. The subject was given the opportunity to ask questions regarding the study and the verbal informed consent form and received answers.Yes     Date and time verbal consent was given: Date: 16 July 2025 Time: 3:31 PM.     Eligibility  Does the subject meet eligibility criteria? Yes   2.   Date Consented: 16 July 2025    Demographics  Date of birth (year only): 1985  Sex: female  Age: 40  Education level: Graduate degree  BMI: 25.4  Ethnicity: Not  or   Race: White        If , specify the country name: not applicable   Home zip code: [facility]  Martial Status:     Gynecologic Excision Medical History          Date of Gynecologic Excision  Surgery:  Day: 26 Month: June  Year: 2025   Type of Gynecologic Excision Surgery: TOTAL LAPAROSCOPIC HYSTERECTOMY, BILATERAL SALPINGECTOMY, RIGHT OVARIAN CYSTECTOMY, CYSTOSCOPY (Abdomen) EXCISION, CYST, OVARY (Right: Abdomen)  Surgical Equipment Used (da Ferm[place], traditional surgical techniques, etc.): Traditional techniques       Medical History  Did the subject have any active comorbidities at the time of gynecological excision surgery or the 90 days post-surgery: No    Comorbidity: (Add additional log lines as needed)   IF YES PLEASE LIST: not applicable       CONTINUE to Quality of Life Scale (QOL) questions if answer was NO to question #1      Survey Questions: Was the Quality of Life Scale (QOLS) worksheet completed with patient over the phone: Yes     - Complete quality of life scale with patient over the phone. Read each item and [place] the number that best describes how satisfied you are at this time. Please answer each item even if you do not currently participate in an activity of have a relationship. You can be satisfied or dissatisfied with not doing the activity or having the relationship.      What is your annual income (assessed in groupings of $10,000: <$10,000, $10,000-19,999, etc.)? $130,000    If you worked before surgery, paid or unpaid, have you returned to work?     - If yes: On what date did you return to work? Yes, 15 July 2025.     If no: Which reason(s) would you indicate are contributing to why you have not returned to work? Not applicable    3. At your job, how often do you (up to 1/3, 1/3 - 2/3, over 2/3 of the time):  Lift up to 10 lbs: Not applicable  Lift 11-20 lbs: Not applicable  Lift 21-50 lbs: Not applicable  Lift  lbs: not applicable     4. At your job, how often do you (in hours per day):  Sit: 2 hours per day  Stand: 8 hours per day  Walk: 1 hour per day  If Other: Please describe: not applicable     5. Have you taken any pain management medications since you were  discharged from your gynecological surgery? Yes     - If yes: How long (in calendar days) did you require pain management support following your discharge? 3  How many medications do you recall taking? 3  Can you describe what medications you were taking, both over the counter and prescription? Yes, prescription and over the counter.     6. Did you use any non-medication pain management techniques? Yes    - If yes: What techniques did you use? Heating pads.     QUALITY OF LIFE SCALE (QOL)  Please read each item and [place] the number that best describes how satisfied you are at this time. Please answer each item even if you do not currently participate in an activity or have a relationship. You can be satisfied or dissatisfied with not doing the activity or having the relationship.    Material comforts home, food, conveniences,financial security: 7 = delighted   2. Health - being physically fit and vigorous: 7 = delighted   3. Relationships with parents, siblings & other relatives- communicating, visiting, helpin = delighted   4. Having and rearing children: 7 = delighted   5. Close relationship with spouse or significant other: 7 = delighted   6. Close friends: 7 = delighted   7. Helping and encouraging others, volunteering, giving advice: 7 = delighted   8. Participating in organizations and public affairs: 7 = delighted   9. Learning - attending school, improving, understanding, getting additional knowledge: 7 = delighted   10. Understanding yourself - knowing your assets and limitations - knowing what life is about: 7 = delighted   11. Work - job or in home: 7 = delighted   12. Expressing yourself creatively: 7 = delighted    13. Socializing - meeting other people, doing things, parties, etc.: 7 = delighted   14. Reading, listening to music, or observing entertainment: 7 = delighted   15. Participating in active recreation: 5 = mostly satisfied  16. [place], doing for yourself: 7 = delighted          Phone Encounter Note/ Additional notes from phone call: Patient did not have any questions.

## 2025-08-01 ENCOUNTER — APPOINTMENT (OUTPATIENT)
Dept: GYNECOLOGY | Facility: CLINIC | Age: 40
End: 2025-08-01
Payer: COMMERCIAL

## 2025-08-01 ASSESSMENT — FIBROSIS 4 INDEX: FIB4 SCORE: 0.67

## (undated) DEVICE — Device

## (undated) DEVICE — GLOVE SZ 6 BIOGEL PI MICRO - PF LF (50PR/BX 4BX/CA)

## (undated) DEVICE — SLEEVE VASO DVT COMPRESSION CALF MED - (10PR/CA)

## (undated) DEVICE — DRAPESURG STERI-DRAPE LONG - (10/BX 4BX/CA)

## (undated) DEVICE — DRAPE UNDER BUTTOCKS FLUID - (20/CA)

## (undated) DEVICE — SUCTION INSTRUMENT YANKAUER BULBOUS TIP W/O VENT (50EA/CA)

## (undated) DEVICE — TRAY FOLEY CATHETER STATLOCK 16FR SURESTEP (10EA/CA)

## (undated) DEVICE — SUTURE 4-0 MONOCRYL PLUS PS-2 - 27 INCH (36/BX)

## (undated) DEVICE — TUBING CLEARLINK DUO-VENT - C-FLO (48EA/CA)

## (undated) DEVICE — SET IRRIGATION CYSTOSCOPY TUBE L80 IN (20EA/CA)

## (undated) DEVICE — DRAPE STRLE REG TOWEL 18X24 - (10/BX 4BX/CA)"

## (undated) DEVICE — TUBE CONNECTING SUCTION - CLEAR PLASTIC STERILE 72 IN (50EA/CA)

## (undated) DEVICE — ELECTRODE DUAL RETURN W/ CORD - (50/PK)

## (undated) DEVICE — DEVICE CLOSURE V LOC 90 GS 22 ABSORBABLE VIOLET 9IN (12EA/CA)

## (undated) DEVICE — PAD SANITARY 11IN MAXI IND WRAPPED (12EA/PK 24PK/CA)

## (undated) DEVICE — WATER IRRIGATION STERILE 1000ML (12EA/CA)

## (undated) DEVICE — SYRINGE 10 ML CONTROL LL (25EA/BX 4BX/CA)

## (undated) DEVICE — CANNULA O2 COMFORT SOFT EAR ADULT 7 FT TUBING (50/CA)

## (undated) DEVICE — LACTATED RINGERS INJ 1000 ML - (14EA/CA 60CA/PF)

## (undated) DEVICE — MASK OXYGEN VNYL ADLT MED CONC WITH 7 FOOT TUBING - (50EA/CA)

## (undated) DEVICE — KIT  I.V. START (100EA/CA)

## (undated) DEVICE — SYSTEM CLEARIFY VISUALIZATION (10EA/PK)

## (undated) DEVICE — SENSOR OXIMETER ADULT SPO2 RD SET (20EA/BX)

## (undated) DEVICE — SET LEADWIRE 5 LEAD BEDSIDE DISPOSABLE ECG (1SET OF 5/EA)

## (undated) DEVICE — GOWN WARMING STANDARD FLEX - (30/CA)

## (undated) DEVICE — SYRINGE 12 CC LUER TIP - (80/BX) OBSOLETE ITEM

## (undated) DEVICE — SODIUM CHL IRRIGATION 0.9% 1000ML (12EA/CA)

## (undated) DEVICE — SUTURE 4-0 MONOCRYL PLUS PS-1 - 27 INCH (36/BX)

## (undated) DEVICE — GLOVE SZ 6.5 BIOGEL PI MICRO - PF LF (50PR/BX)

## (undated) DEVICE — NEEDLE INSFL 120MM 14GA VRRS - (20/BX)

## (undated) DEVICE — APPLICATOR ENDOSCOPIC SURGICEL (5EA/BX)

## (undated) DEVICE — SUTURE GENERAL

## (undated) DEVICE — CANISTER SUCTION 3000ML MECHANICAL FILTER AUTO SHUTOFF MEDI-VAC NONSTERILE LF DISP (40EA/CA)

## (undated) DEVICE — SET TUBING PNEUMOCLEAR HIGH FLOW SMOKE EVACUATION (10EA/BX)

## (undated) DEVICE — TUBE E-T HI-LO CUFF 7.0MM (10EA/PK)

## (undated) DEVICE — GLOVE BIOGEL PI INDICATOR SZ 7.0 SURGICAL PF LF - (50/BX 4BX/CA)

## (undated) DEVICE — HEMOSTAT ABSORBABLE POWDER SURGICEL 3G (5EA/BX)

## (undated) DEVICE — CANISTER SUCTION RIGID RED 1500CC (40EA/CA)

## (undated) DEVICE — SYRINGE STERILE 10 ML LL (200/BX)

## (undated) DEVICE — TROCAR 5X100 SEPARTATOR ADV - FIXATION (6/BX)

## (undated) DEVICE — TOWEL STOP TIMEOUT SAFETY FLAG (40EA/CA)